# Patient Record
Sex: FEMALE | Race: WHITE | NOT HISPANIC OR LATINO | URBAN - METROPOLITAN AREA
[De-identification: names, ages, dates, MRNs, and addresses within clinical notes are randomized per-mention and may not be internally consistent; named-entity substitution may affect disease eponyms.]

---

## 2021-07-17 ENCOUNTER — HOSPITAL ENCOUNTER (EMERGENCY)
Facility: HOSPITAL | Age: 26
Discharge: HOME | End: 2021-07-17
Attending: EMERGENCY MEDICINE
Payer: COMMERCIAL

## 2021-07-17 VITALS
TEMPERATURE: 98.8 F | RESPIRATION RATE: 14 BRPM | HEIGHT: 61 IN | HEART RATE: 68 BPM | BODY MASS INDEX: 22.66 KG/M2 | DIASTOLIC BLOOD PRESSURE: 68 MMHG | OXYGEN SATURATION: 100 % | WEIGHT: 120 LBS | SYSTOLIC BLOOD PRESSURE: 104 MMHG

## 2021-07-17 DIAGNOSIS — B37.0 THRUSH: Primary | ICD-10-CM

## 2021-07-17 RX ORDER — NYSTATIN 100000 [USP'U]/ML
5 SUSPENSION ORAL 4 TIMES DAILY PRN
Qty: 140 ML | Refills: 0 | Status: SHIPPED | OUTPATIENT
Start: 2021-07-17 | End: 2021-07-24

## 2021-07-17 ASSESSMENT — ENCOUNTER SYMPTOMS
RHINORRHEA: 0
ABDOMINAL PAIN: 0
SHORTNESS OF BREATH: 0
SORE THROAT: 1
ACTIVITY CHANGE: 0
NAUSEA: 0
TROUBLE SWALLOWING: 0
FACIAL SWELLING: 0
DIAPHORESIS: 0
APPETITE CHANGE: 0
COUGH: 0
VOMITING: 0
FEVER: 1
DIZZINESS: 0
SINUS PAIN: 0
LIGHT-HEADEDNESS: 0
HEADACHES: 0
FATIGUE: 0
VOICE CHANGE: 0

## 2021-07-17 NOTE — ED ATTESTATION NOTE
The patient was evaluated and managed by the physician assistant / nurse practitioner.     Dinesh Mane MD  07/17/21 0665

## 2021-07-17 NOTE — ED PROVIDER NOTES
Emergency Medicine Note  HPI   HISTORY OF PRESENT ILLNESS     25-year-old female here for sore throat.  Patient reports for the past week has had a sore throat and was seen at [facility] in [place] for these complaints.  Was tested and treated for strep throat.  Was on amoxicillin, steroids and ibuprofen.  Reports that the symptoms of her throat are actually improving significantly.  She can swallow and tolerate secretions.  And the fever has resolved but she reports now with pain and white stuff to her tongue.  She denies any chest pain, shortness of breath, dizziness.      Sore Throat  Associated symptoms: fever    Associated symptoms: no abdominal pain, no cough, no headaches, no rash, no rhinorrhea, no shortness of breath, no trouble swallowing and no voice change          Patient History   PAST HISTORY     Reviewed from Nursing Triage:      No past medical history on file.    No past surgical history on file.    No family history on file.    Social History     Tobacco Use   • Smoking status: Never Smoker   • Smokeless tobacco: Never Used   Vaping Use   • Vaping Use: Never used   Substance Use Topics   • Alcohol use: Yes   • Drug use: Not on file     Comment: On Methadone          Review of Systems   REVIEW OF SYSTEMS     Review of Systems   Constitutional: Positive for fever. Negative for activity change, appetite change, diaphoresis and fatigue.   HENT: Positive for mouth sores and sore throat. Negative for congestion, facial swelling, hearing loss, rhinorrhea, sinus pain, sneezing, trouble swallowing and voice change.    Respiratory: Negative for cough and shortness of breath.    Cardiovascular: Negative for leg swelling.   Gastrointestinal: Negative for abdominal pain, nausea and vomiting.   Skin: Negative for rash.   Neurological: Negative for dizziness, light-headedness and headaches.         VITALS     ED Vitals    Date/Time Temp Pulse Resp BP SpO2 [facility]   07/17/21 1058 37.1 °C (98.8  °F) 68 14 104/68 100 % SFC   07/17/21 0950 37.3 °C (99.1 °F) 72 16 101/70 100 % GS        Pulse Ox %: 99 % (07/17/21 1031)  Pulse Ox Interpretation: Normal (07/17/21 1031)           Physical Exam   PHYSICAL EXAM     Physical Exam  Vitals and nursing note reviewed.   Constitutional:       General: She is not in acute distress.     Appearance: She is well-developed and normal weight. She is not ill-appearing, toxic-appearing or diaphoretic.   HENT:      Right Ear: Tympanic membrane normal.      Left Ear: Tympanic membrane normal.      Nose: Congestion present.      Mouth/Throat:      Mouth: Mucous membranes are dry.      Pharynx: Uvula midline. Oropharyngeal exudate present. No uvula swelling.      Tonsils: Tonsillar exudate present. No tonsillar abscesses. 1+ on the right. 1+ on the left.      Comments: Thrush to tongue  Cardiovascular:      Rate and Rhythm: Normal rate and regular rhythm.   Pulmonary:      Effort: Pulmonary effort is normal. No respiratory distress.      Breath sounds: No wheezing or rales.   Musculoskeletal:      Cervical back: Normal range of motion.   Lymphadenopathy:      Cervical: Cervical adenopathy present.   Skin:     General: Skin is warm.   Neurological:      Mental Status: She is alert and oriented to person, place, and time.           PROCEDURES     Procedures     DATA     Results     None          Imaging Results    None         No orders to display       Scoring tools                                 ED Course & MDM   MDM / ED COURSE and CLINICAL IMPRESSIONS     MDM    ED Course as of Jul 17 1059   Sat Jul 17, 2021   1059 Plan to finish antibiotic  No sign of abscess  Tx thrush    [DE]      ED Course User Index  [DE] Mercy Nur PA C         Clinical Impressions as of Jul 17 1059   Mercy Lee PA C  07/17/21 1059

## 2021-08-25 ENCOUNTER — APPOINTMENT (EMERGENCY)
Dept: RADIOLOGY | Facility: HOSPITAL | Age: 26
DRG: 137 | End: 2021-08-25
Payer: COMMERCIAL

## 2021-08-25 ENCOUNTER — HOSPITAL ENCOUNTER (INPATIENT)
Facility: HOSPITAL | Age: 26
LOS: 6 days | Discharge: HOME | DRG: 137 | End: 2021-09-01
Attending: EMERGENCY MEDICINE | Admitting: INTERNAL MEDICINE
Payer: COMMERCIAL

## 2021-08-25 DIAGNOSIS — D64.9 ANEMIA: ICD-10-CM

## 2021-08-25 DIAGNOSIS — A59.9 TRICHOMONIASIS: ICD-10-CM

## 2021-08-25 DIAGNOSIS — R50.9 FEVER: ICD-10-CM

## 2021-08-25 DIAGNOSIS — F11.21 OPIOID DEPENDENCE IN REMISSION (HCC): ICD-10-CM

## 2021-08-25 DIAGNOSIS — R11.2 NAUSEA & VOMITING: Primary | ICD-10-CM

## 2021-08-25 DIAGNOSIS — R00.0 TACHYCARDIA: ICD-10-CM

## 2021-08-25 LAB
ALBUMIN SERPL BCP-MCNC: 2.3 G/DL (ref 3.5–5)
ALP SERPL-CCNC: 344 U/L (ref 46–116)
ALT SERPL W P-5'-P-CCNC: 37 U/L (ref 12–78)
ANION GAP SERPL CALCULATED.3IONS-SCNC: 7 MMOL/L (ref 4–13)
APTT PPP: 37 SECONDS (ref 23–37)
ARTIFACT: PRESENT
AST SERPL W P-5'-P-CCNC: 61 U/L (ref 5–45)
BACTERIA UR QL AUTO: ABNORMAL /HPF
BASOPHILS # BLD MANUAL: 0 THOUSAND/UL (ref 0–0.1)
BASOPHILS NFR MAR MANUAL: 0 % (ref 0–1)
BILIRUB SERPL-MCNC: 0.64 MG/DL (ref 0.2–1)
BILIRUB UR QL STRIP: NEGATIVE
BILIRUB UR QL STRIP: NEGATIVE
BUN SERPL-MCNC: 13 MG/DL (ref 5–25)
CALCIUM ALBUM COR SERPL-MCNC: 9 MG/DL (ref 8.3–10.1)
CALCIUM SERPL-MCNC: 7.6 MG/DL (ref 8.3–10.1)
CHLORIDE SERPL-SCNC: 109 MMOL/L (ref 100–108)
CK MB SERPL-MCNC: 1.1 NG/ML (ref 0–5)
CK MB SERPL-MCNC: <1 % (ref 0–2.5)
CK SERPL-CCNC: 178 U/L (ref 26–192)
CLARITY UR: ABNORMAL
CLARITY UR: CLEAR
CO2 SERPL-SCNC: 21 MMOL/L (ref 21–32)
COLOR UR: YELLOW
COLOR UR: YELLOW
CREAT SERPL-MCNC: 0.72 MG/DL (ref 0.6–1.3)
CRP SERPL QL: 128 MG/L
D DIMER PPP FEU-MCNC: 2.79 UG/ML FEU
EOSINOPHIL # BLD MANUAL: 0 THOUSAND/UL (ref 0–0.4)
EOSINOPHIL NFR BLD MANUAL: 0 % (ref 0–6)
ERYTHROCYTE [DISTWIDTH] IN BLOOD BY AUTOMATED COUNT: 17.6 % (ref 11.6–15.1)
EXT PREG TEST URINE: NEGATIVE
EXT. CONTROL ED NAV: NORMAL
GFR SERPL CREATININE-BSD FRML MDRD: 116 ML/MIN/1.73SQ M
GLUCOSE SERPL-MCNC: 92 MG/DL (ref 65–140)
GLUCOSE UR STRIP-MCNC: NEGATIVE MG/DL
GLUCOSE UR STRIP-MCNC: NEGATIVE MG/DL
HCT VFR BLD AUTO: 30.1 % (ref 34.8–46.1)
HGB BLD-MCNC: 9.8 G/DL (ref 11.5–15.4)
HGB UR QL STRIP.AUTO: ABNORMAL
HGB UR QL STRIP.AUTO: ABNORMAL
INR PPP: 1.24 (ref 0.84–1.19)
KETONES UR STRIP-MCNC: NEGATIVE MG/DL
KETONES UR STRIP-MCNC: NEGATIVE MG/DL
LACTATE SERPL-SCNC: 0.9 MMOL/L (ref 0.5–2)
LEUKOCYTE ESTERASE UR QL STRIP: ABNORMAL
LEUKOCYTE ESTERASE UR QL STRIP: ABNORMAL
LIPASE SERPL-CCNC: 36 U/L (ref 73–393)
LYMPHOCYTES # BLD AUTO: 0.42 THOUSAND/UL (ref 0.6–4.47)
LYMPHOCYTES # BLD AUTO: 4 % (ref 14–44)
MCH RBC QN AUTO: 29.2 PG (ref 26.8–34.3)
MCHC RBC AUTO-ENTMCNC: 32.6 G/DL (ref 31.4–37.4)
MCV RBC AUTO: 90 FL (ref 82–98)
METAMYELOCYTES NFR BLD MANUAL: 3 % (ref 0–1)
MONOCYTES # BLD AUTO: 0.21 THOUSAND/UL (ref 0–1.22)
MONOCYTES NFR BLD: 2 % (ref 4–12)
MYELOCYTES NFR BLD MANUAL: 1 % (ref 0–1)
NEUTROPHILS # BLD MANUAL: 9.16 THOUSAND/UL (ref 1.85–7.62)
NEUTS BAND NFR BLD MANUAL: 55 % (ref 0–8)
NEUTS SEG NFR BLD AUTO: 33 % (ref 43–75)
NITRITE UR QL STRIP: NEGATIVE
NITRITE UR QL STRIP: NEGATIVE
NON-SQ EPI CELLS URNS QL MICRO: ABNORMAL /HPF
NT-PROBNP SERPL-MCNC: 256 PG/ML
OTHER STN SPEC: ABNORMAL
PH UR STRIP.AUTO: 5.5 [PH]
PH UR STRIP.AUTO: 5.5 [PH] (ref 4.5–8)
PLATELET # BLD AUTO: 269 THOUSANDS/UL (ref 149–390)
PLATELET BLD QL SMEAR: ADEQUATE
PMV BLD AUTO: 10.6 FL (ref 8.9–12.7)
POLYCHROMASIA BLD QL SMEAR: PRESENT
POTASSIUM SERPL-SCNC: 3.2 MMOL/L (ref 3.5–5.3)
PROCALCITONIN SERPL-MCNC: 3.06 NG/ML
PROT SERPL-MCNC: 5.9 G/DL (ref 6.4–8.2)
PROT UR STRIP-MCNC: NEGATIVE MG/DL
PROT UR STRIP-MCNC: NEGATIVE MG/DL
PROTHROMBIN TIME: 15.6 SECONDS (ref 11.6–14.5)
RBC # BLD AUTO: 3.36 MILLION/UL (ref 3.81–5.12)
RBC #/AREA URNS AUTO: ABNORMAL /HPF
RBC MORPH BLD: PRESENT
SARS-COV-2 RNA RESP QL NAA+PROBE: POSITIVE
SODIUM SERPL-SCNC: 137 MMOL/L (ref 136–145)
SP GR UR STRIP.AUTO: 1.01 (ref 1–1.03)
SP GR UR STRIP.AUTO: 1.01 (ref 1–1.03)
TSH SERPL DL<=0.05 MIU/L-ACNC: 0.34 UIU/ML (ref 0.36–3.74)
UROBILINOGEN UR QL STRIP.AUTO: 0.2 E.U./DL
UROBILINOGEN UR QL STRIP.AUTO: 0.2 E.U./DL
VARIANT LYMPHS # BLD AUTO: 2 %
WBC # BLD AUTO: 10.41 THOUSAND/UL (ref 4.31–10.16)
WBC #/AREA URNS AUTO: ABNORMAL /HPF

## 2021-08-25 PROCEDURE — 83605 ASSAY OF LACTIC ACID: CPT | Performed by: EMERGENCY MEDICINE

## 2021-08-25 PROCEDURE — 84443 ASSAY THYROID STIM HORMONE: CPT | Performed by: EMERGENCY MEDICINE

## 2021-08-25 PROCEDURE — 85007 BL SMEAR W/DIFF WBC COUNT: CPT | Performed by: EMERGENCY MEDICINE

## 2021-08-25 PROCEDURE — 86140 C-REACTIVE PROTEIN: CPT | Performed by: STUDENT IN AN ORGANIZED HEALTH CARE EDUCATION/TRAINING PROGRAM

## 2021-08-25 PROCEDURE — 80053 COMPREHEN METABOLIC PANEL: CPT | Performed by: EMERGENCY MEDICINE

## 2021-08-25 PROCEDURE — 85379 FIBRIN DEGRADATION QUANT: CPT | Performed by: EMERGENCY MEDICINE

## 2021-08-25 PROCEDURE — G1004 CDSM NDSC: HCPCS

## 2021-08-25 PROCEDURE — 96365 THER/PROPH/DIAG IV INF INIT: CPT

## 2021-08-25 PROCEDURE — 71275 CT ANGIOGRAPHY CHEST: CPT

## 2021-08-25 PROCEDURE — 81025 URINE PREGNANCY TEST: CPT | Performed by: EMERGENCY MEDICINE

## 2021-08-25 PROCEDURE — 85730 THROMBOPLASTIN TIME PARTIAL: CPT | Performed by: EMERGENCY MEDICINE

## 2021-08-25 PROCEDURE — 99285 EMERGENCY DEPT VISIT HI MDM: CPT

## 2021-08-25 PROCEDURE — 83880 ASSAY OF NATRIURETIC PEPTIDE: CPT | Performed by: STUDENT IN AN ORGANIZED HEALTH CARE EDUCATION/TRAINING PROGRAM

## 2021-08-25 PROCEDURE — 82553 CREATINE MB FRACTION: CPT | Performed by: STUDENT IN AN ORGANIZED HEALTH CARE EDUCATION/TRAINING PROGRAM

## 2021-08-25 PROCEDURE — 85610 PROTHROMBIN TIME: CPT | Performed by: EMERGENCY MEDICINE

## 2021-08-25 PROCEDURE — 83615 LACTATE (LD) (LDH) ENZYME: CPT | Performed by: STUDENT IN AN ORGANIZED HEALTH CARE EDUCATION/TRAINING PROGRAM

## 2021-08-25 PROCEDURE — 83690 ASSAY OF LIPASE: CPT | Performed by: EMERGENCY MEDICINE

## 2021-08-25 PROCEDURE — 87040 BLOOD CULTURE FOR BACTERIA: CPT | Performed by: EMERGENCY MEDICINE

## 2021-08-25 PROCEDURE — 81001 URINALYSIS AUTO W/SCOPE: CPT | Performed by: EMERGENCY MEDICINE

## 2021-08-25 PROCEDURE — 99285 EMERGENCY DEPT VISIT HI MDM: CPT | Performed by: EMERGENCY MEDICINE

## 2021-08-25 PROCEDURE — 36415 COLL VENOUS BLD VENIPUNCTURE: CPT | Performed by: EMERGENCY MEDICINE

## 2021-08-25 PROCEDURE — U0005 INFEC AGEN DETEC AMPLI PROBE: HCPCS | Performed by: EMERGENCY MEDICINE

## 2021-08-25 PROCEDURE — 82550 ASSAY OF CK (CPK): CPT | Performed by: STUDENT IN AN ORGANIZED HEALTH CARE EDUCATION/TRAINING PROGRAM

## 2021-08-25 PROCEDURE — 87086 URINE CULTURE/COLONY COUNT: CPT | Performed by: EMERGENCY MEDICINE

## 2021-08-25 PROCEDURE — 84145 PROCALCITONIN (PCT): CPT | Performed by: EMERGENCY MEDICINE

## 2021-08-25 PROCEDURE — 93005 ELECTROCARDIOGRAM TRACING: CPT

## 2021-08-25 PROCEDURE — U0003 INFECTIOUS AGENT DETECTION BY NUCLEIC ACID (DNA OR RNA); SEVERE ACUTE RESPIRATORY SYNDROME CORONAVIRUS 2 (SARS-COV-2) (CORONAVIRUS DISEASE [COVID-19]), AMPLIFIED PROBE TECHNIQUE, MAKING USE OF HIGH THROUGHPUT TECHNOLOGIES AS DESCRIBED BY CMS-2020-01-R: HCPCS | Performed by: EMERGENCY MEDICINE

## 2021-08-25 PROCEDURE — 85027 COMPLETE CBC AUTOMATED: CPT | Performed by: EMERGENCY MEDICINE

## 2021-08-25 RX ORDER — ACETAMINOPHEN 325 MG/1
975 TABLET ORAL ONCE
Status: COMPLETED | OUTPATIENT
Start: 2021-08-25 | End: 2021-08-25

## 2021-08-25 RX ORDER — SODIUM CHLORIDE, SODIUM GLUCONATE, SODIUM ACETATE, POTASSIUM CHLORIDE, MAGNESIUM CHLORIDE, SODIUM PHOSPHATE, DIBASIC, AND POTASSIUM PHOSPHATE .53; .5; .37; .037; .03; .012; .00082 G/100ML; G/100ML; G/100ML; G/100ML; G/100ML; G/100ML; G/100ML
1000 INJECTION, SOLUTION INTRAVENOUS ONCE
Status: COMPLETED | OUTPATIENT
Start: 2021-08-25 | End: 2021-08-25

## 2021-08-25 RX ORDER — ONDANSETRON 2 MG/ML
1 INJECTION INTRAMUSCULAR; INTRAVENOUS ONCE
Status: COMPLETED | OUTPATIENT
Start: 2021-08-25 | End: 2021-08-25

## 2021-08-25 RX ORDER — LANOLIN ALCOHOL/MO/W.PET/CERES
100 CREAM (GRAM) TOPICAL
COMMUNITY
Start: 2021-08-02

## 2021-08-25 RX ORDER — FOLIC ACID 1 MG/1
1 TABLET ORAL DAILY
COMMUNITY
Start: 2021-08-02

## 2021-08-25 RX ORDER — SODIUM CHLORIDE 9 MG/ML
3 INJECTION INTRAVENOUS
Status: DISCONTINUED | OUTPATIENT
Start: 2021-08-25 | End: 2021-09-01 | Stop reason: HOSPADM

## 2021-08-25 RX ORDER — BUPRENORPHINE AND NALOXONE 8; 2 MG/1; MG/1
8 FILM, SOLUBLE BUCCAL; SUBLINGUAL DAILY
Status: DISCONTINUED | OUTPATIENT
Start: 2021-08-25 | End: 2021-08-26

## 2021-08-25 RX ORDER — BUPRENORPHINE AND NALOXONE 8; 2 MG/1; MG/1
8 FILM, SOLUBLE BUCCAL; SUBLINGUAL DAILY
Status: DISCONTINUED | OUTPATIENT
Start: 2021-08-26 | End: 2021-08-25

## 2021-08-25 RX ORDER — ONDANSETRON 2 MG/ML
INJECTION INTRAMUSCULAR; INTRAVENOUS
Status: DISPENSED
Start: 2021-08-25 | End: 2021-08-26

## 2021-08-25 RX ORDER — METRONIDAZOLE 500 MG/1
2000 TABLET ORAL ONCE
Status: COMPLETED | OUTPATIENT
Start: 2021-08-25 | End: 2021-08-26

## 2021-08-25 RX ADMIN — ACETAMINOPHEN 975 MG: 325 TABLET, FILM COATED ORAL at 18:46

## 2021-08-25 RX ADMIN — IOHEXOL 85 ML: 350 INJECTION, SOLUTION INTRAVENOUS at 20:05

## 2021-08-25 RX ADMIN — BUPRENORPHINE AND NALOXONE 8 MG: 8; 2 FILM BUCCAL; SUBLINGUAL at 21:32

## 2021-08-25 RX ADMIN — SODIUM CHLORIDE, SODIUM GLUCONATE, SODIUM ACETATE, POTASSIUM CHLORIDE, MAGNESIUM CHLORIDE, SODIUM PHOSPHATE, DIBASIC, AND POTASSIUM PHOSPHATE 1000 ML: .53; .5; .37; .037; .03; .012; .00082 INJECTION, SOLUTION INTRAVENOUS at 18:46

## 2021-08-25 NOTE — ED NOTES
1000ml of Isolyte infusing from ambulance, pt was given Zofran 4mg IV     Jeb Penn RN  08/25/21 5336

## 2021-08-26 PROBLEM — D64.9 ANEMIA: Status: ACTIVE | Noted: 2021-08-26

## 2021-08-26 PROBLEM — F11.20 OPIOID DEPENDENCE (HCC): Status: ACTIVE | Noted: 2021-08-26

## 2021-08-26 PROBLEM — B19.20 HEPATITIS C: Status: ACTIVE | Noted: 2021-08-26

## 2021-08-26 PROBLEM — R11.10 VOMITING: Status: ACTIVE | Noted: 2021-08-26

## 2021-08-26 PROBLEM — U07.1 COVID-19: Status: ACTIVE | Noted: 2021-08-26

## 2021-08-26 PROBLEM — F17.200 TOBACCO DEPENDENCE: Status: ACTIVE | Noted: 2021-08-26

## 2021-08-26 PROBLEM — E87.6 HYPOKALEMIA: Status: ACTIVE | Noted: 2021-08-26

## 2021-08-26 PROBLEM — A59.9 TRICHIMONIASIS: Status: ACTIVE | Noted: 2021-08-26

## 2021-08-26 LAB
ALBUMIN SERPL BCP-MCNC: 2.3 G/DL (ref 3.5–5)
ALP SERPL-CCNC: 300 U/L (ref 46–116)
ALT SERPL W P-5'-P-CCNC: 34 U/L (ref 12–78)
ANION GAP SERPL CALCULATED.3IONS-SCNC: 4 MMOL/L (ref 4–13)
AST SERPL W P-5'-P-CCNC: 47 U/L (ref 5–45)
ATRIAL RATE: 128 BPM
BILIRUB SERPL-MCNC: 0.33 MG/DL (ref 0.2–1)
BUN SERPL-MCNC: 10 MG/DL (ref 5–25)
CALCIUM ALBUM COR SERPL-MCNC: 9 MG/DL (ref 8.3–10.1)
CALCIUM SERPL-MCNC: 7.6 MG/DL (ref 8.3–10.1)
CHLORIDE SERPL-SCNC: 109 MMOL/L (ref 100–108)
CK MB SERPL-MCNC: <1 % (ref 0–2.5)
CK MB SERPL-MCNC: <1 NG/ML (ref 0–5)
CK SERPL-CCNC: 135 U/L (ref 26–192)
CO2 SERPL-SCNC: 23 MMOL/L (ref 21–32)
CREAT SERPL-MCNC: 0.6 MG/DL (ref 0.6–1.3)
CRP SERPL QL: 209 MG/L
ERYTHROCYTE [DISTWIDTH] IN BLOOD BY AUTOMATED COUNT: 17.9 % (ref 11.6–15.1)
FERRITIN SERPL-MCNC: 105 NG/ML (ref 8–388)
GFR SERPL CREATININE-BSD FRML MDRD: 126 ML/MIN/1.73SQ M
GLUCOSE SERPL-MCNC: 106 MG/DL (ref 65–140)
HCG SERPL QL: NEGATIVE
HCT VFR BLD AUTO: 30.4 % (ref 34.8–46.1)
HCV AB SER QL: ABNORMAL
HGB BLD-MCNC: 9.9 G/DL (ref 11.5–15.4)
IRON SATN MFR SERPL: 5 %
IRON SERPL-MCNC: 10 UG/DL (ref 50–170)
LDH SERPL-CCNC: 186 U/L (ref 81–234)
MAGNESIUM SERPL-MCNC: 2 MG/DL (ref 1.6–2.6)
MCH RBC QN AUTO: 28.8 PG (ref 26.8–34.3)
MCHC RBC AUTO-ENTMCNC: 32.6 G/DL (ref 31.4–37.4)
MCV RBC AUTO: 88 FL (ref 82–98)
NT-PROBNP SERPL-MCNC: 542 PG/ML
P AXIS: 62 DEGREES
PLATELET # BLD AUTO: 272 THOUSANDS/UL (ref 149–390)
PMV BLD AUTO: 11 FL (ref 8.9–12.7)
POTASSIUM SERPL-SCNC: 3.7 MMOL/L (ref 3.5–5.3)
PR INTERVAL: 136 MS
PROCALCITONIN SERPL-MCNC: 6.13 NG/ML
PROT SERPL-MCNC: 5.9 G/DL (ref 6.4–8.2)
QRS AXIS: 69 DEGREES
QRSD INTERVAL: 94 MS
QT INTERVAL: 312 MS
QTC INTERVAL: 455 MS
RBC # BLD AUTO: 3.44 MILLION/UL (ref 3.81–5.12)
SODIUM SERPL-SCNC: 136 MMOL/L (ref 136–145)
T WAVE AXIS: 38 DEGREES
TIBC SERPL-MCNC: 212 UG/DL (ref 250–450)
TROPONIN I SERPL-MCNC: <0.02 NG/ML
VENTRICULAR RATE: 128 BPM
WBC # BLD AUTO: 12.6 THOUSAND/UL (ref 4.31–10.16)

## 2021-08-26 PROCEDURE — 82553 CREATINE MB FRACTION: CPT | Performed by: STUDENT IN AN ORGANIZED HEALTH CARE EDUCATION/TRAINING PROGRAM

## 2021-08-26 PROCEDURE — NC001 PR NO CHARGE: Performed by: INTERNAL MEDICINE

## 2021-08-26 PROCEDURE — 87389 HIV-1 AG W/HIV-1&-2 AB AG IA: CPT | Performed by: STUDENT IN AN ORGANIZED HEALTH CARE EDUCATION/TRAINING PROGRAM

## 2021-08-26 PROCEDURE — 83880 ASSAY OF NATRIURETIC PEPTIDE: CPT | Performed by: STUDENT IN AN ORGANIZED HEALTH CARE EDUCATION/TRAINING PROGRAM

## 2021-08-26 PROCEDURE — 86803 HEPATITIS C AB TEST: CPT | Performed by: STUDENT IN AN ORGANIZED HEALTH CARE EDUCATION/TRAINING PROGRAM

## 2021-08-26 PROCEDURE — 36415 COLL VENOUS BLD VENIPUNCTURE: CPT | Performed by: STUDENT IN AN ORGANIZED HEALTH CARE EDUCATION/TRAINING PROGRAM

## 2021-08-26 PROCEDURE — 82550 ASSAY OF CK (CPK): CPT | Performed by: STUDENT IN AN ORGANIZED HEALTH CARE EDUCATION/TRAINING PROGRAM

## 2021-08-26 PROCEDURE — 82728 ASSAY OF FERRITIN: CPT | Performed by: STUDENT IN AN ORGANIZED HEALTH CARE EDUCATION/TRAINING PROGRAM

## 2021-08-26 PROCEDURE — 84703 CHORIONIC GONADOTROPIN ASSAY: CPT | Performed by: STUDENT IN AN ORGANIZED HEALTH CARE EDUCATION/TRAINING PROGRAM

## 2021-08-26 PROCEDURE — 84145 PROCALCITONIN (PCT): CPT

## 2021-08-26 PROCEDURE — 84484 ASSAY OF TROPONIN QUANT: CPT | Performed by: STUDENT IN AN ORGANIZED HEALTH CARE EDUCATION/TRAINING PROGRAM

## 2021-08-26 PROCEDURE — 80053 COMPREHEN METABOLIC PANEL: CPT | Performed by: STUDENT IN AN ORGANIZED HEALTH CARE EDUCATION/TRAINING PROGRAM

## 2021-08-26 PROCEDURE — 93010 ELECTROCARDIOGRAM REPORT: CPT | Performed by: INTERNAL MEDICINE

## 2021-08-26 PROCEDURE — 85027 COMPLETE CBC AUTOMATED: CPT | Performed by: STUDENT IN AN ORGANIZED HEALTH CARE EDUCATION/TRAINING PROGRAM

## 2021-08-26 PROCEDURE — 83540 ASSAY OF IRON: CPT | Performed by: STUDENT IN AN ORGANIZED HEALTH CARE EDUCATION/TRAINING PROGRAM

## 2021-08-26 PROCEDURE — 83735 ASSAY OF MAGNESIUM: CPT | Performed by: STUDENT IN AN ORGANIZED HEALTH CARE EDUCATION/TRAINING PROGRAM

## 2021-08-26 PROCEDURE — 86140 C-REACTIVE PROTEIN: CPT | Performed by: STUDENT IN AN ORGANIZED HEALTH CARE EDUCATION/TRAINING PROGRAM

## 2021-08-26 PROCEDURE — 87522 HEPATITIS C REVRS TRNSCRPJ: CPT | Performed by: STUDENT IN AN ORGANIZED HEALTH CARE EDUCATION/TRAINING PROGRAM

## 2021-08-26 PROCEDURE — 83550 IRON BINDING TEST: CPT | Performed by: STUDENT IN AN ORGANIZED HEALTH CARE EDUCATION/TRAINING PROGRAM

## 2021-08-26 RX ORDER — LANOLIN ALCOHOL/MO/W.PET/CERES
100 CREAM (GRAM) TOPICAL DAILY
Status: DISCONTINUED | OUTPATIENT
Start: 2021-08-26 | End: 2021-09-01 | Stop reason: HOSPADM

## 2021-08-26 RX ORDER — FERROUS SULFATE 325(65) MG
325 TABLET ORAL
Status: DISCONTINUED | OUTPATIENT
Start: 2021-08-26 | End: 2021-09-01 | Stop reason: HOSPADM

## 2021-08-26 RX ORDER — POTASSIUM CHLORIDE 20 MEQ/1
40 TABLET, EXTENDED RELEASE ORAL 2 TIMES DAILY
Status: DISPENSED | OUTPATIENT
Start: 2021-08-26 | End: 2021-08-27

## 2021-08-26 RX ORDER — ONDANSETRON 2 MG/ML
4 INJECTION INTRAMUSCULAR; INTRAVENOUS EVERY 8 HOURS PRN
Status: DISCONTINUED | OUTPATIENT
Start: 2021-08-26 | End: 2021-09-01 | Stop reason: HOSPADM

## 2021-08-26 RX ORDER — DOXYCYCLINE HYCLATE 100 MG/1
100 CAPSULE ORAL EVERY 12 HOURS SCHEDULED
Status: DISCONTINUED | OUTPATIENT
Start: 2021-08-26 | End: 2021-08-26

## 2021-08-26 RX ORDER — DOXYCYCLINE HYCLATE 100 MG/1
100 CAPSULE ORAL 2 TIMES DAILY
Status: DISCONTINUED | OUTPATIENT
Start: 2021-08-27 | End: 2021-08-26

## 2021-08-26 RX ORDER — ACETAMINOPHEN 325 MG/1
650 TABLET ORAL EVERY 6 HOURS PRN
Status: DISCONTINUED | OUTPATIENT
Start: 2021-08-26 | End: 2021-09-01 | Stop reason: HOSPADM

## 2021-08-26 RX ORDER — BUPRENORPHINE AND NALOXONE 8; 2 MG/1; MG/1
8 FILM, SOLUBLE BUCCAL; SUBLINGUAL 2 TIMES DAILY
Status: DISCONTINUED | OUTPATIENT
Start: 2021-08-26 | End: 2021-09-01 | Stop reason: HOSPADM

## 2021-08-26 RX ORDER — SODIUM CHLORIDE, SODIUM GLUCONATE, SODIUM ACETATE, POTASSIUM CHLORIDE, MAGNESIUM CHLORIDE, SODIUM PHOSPHATE, DIBASIC, AND POTASSIUM PHOSPHATE .53; .5; .37; .037; .03; .012; .00082 G/100ML; G/100ML; G/100ML; G/100ML; G/100ML; G/100ML; G/100ML
125 INJECTION, SOLUTION INTRAVENOUS CONTINUOUS
Status: DISCONTINUED | OUTPATIENT
Start: 2021-08-26 | End: 2021-08-26

## 2021-08-26 RX ORDER — FERROUS SULFATE 325(65) MG
325 TABLET ORAL
Status: DISCONTINUED | OUTPATIENT
Start: 2021-08-26 | End: 2021-08-26

## 2021-08-26 RX ORDER — SODIUM CHLORIDE, SODIUM GLUCONATE, SODIUM ACETATE, POTASSIUM CHLORIDE, MAGNESIUM CHLORIDE, SODIUM PHOSPHATE, DIBASIC, AND POTASSIUM PHOSPHATE .53; .5; .37; .037; .03; .012; .00082 G/100ML; G/100ML; G/100ML; G/100ML; G/100ML; G/100ML; G/100ML
1000 INJECTION, SOLUTION INTRAVENOUS ONCE
Status: COMPLETED | OUTPATIENT
Start: 2021-08-26 | End: 2021-08-26

## 2021-08-26 RX ORDER — BUPROPION HYDROCHLORIDE 150 MG/1
300 TABLET ORAL EVERY MORNING
Status: DISCONTINUED | OUTPATIENT
Start: 2021-08-26 | End: 2021-09-01 | Stop reason: HOSPADM

## 2021-08-26 RX ORDER — BUPROPION HYDROCHLORIDE 300 MG/1
300 TABLET ORAL EVERY MORNING
COMMUNITY

## 2021-08-26 RX ORDER — DOXYCYCLINE HYCLATE 100 MG/1
100 CAPSULE ORAL 2 TIMES DAILY
Status: DISCONTINUED | OUTPATIENT
Start: 2021-08-26 | End: 2021-08-26

## 2021-08-26 RX ORDER — BUPRENORPHINE AND NALOXONE 8; 2 MG/1; MG/1
8 FILM, SOLUBLE BUCCAL; SUBLINGUAL 2 TIMES DAILY WITH MEALS
Status: CANCELLED | OUTPATIENT
Start: 2021-08-26

## 2021-08-26 RX ORDER — SODIUM CHLORIDE, SODIUM GLUCONATE, SODIUM ACETATE, POTASSIUM CHLORIDE, MAGNESIUM CHLORIDE, SODIUM PHOSPHATE, DIBASIC, AND POTASSIUM PHOSPHATE .53; .5; .37; .037; .03; .012; .00082 G/100ML; G/100ML; G/100ML; G/100ML; G/100ML; G/100ML; G/100ML
125 INJECTION, SOLUTION INTRAVENOUS CONTINUOUS
Status: DISCONTINUED | OUTPATIENT
Start: 2021-08-26 | End: 2021-08-31

## 2021-08-26 RX ORDER — DOXYCYCLINE HYCLATE 100 MG/1
100 CAPSULE ORAL ONCE
Status: DISCONTINUED | OUTPATIENT
Start: 2021-08-26 | End: 2021-08-26

## 2021-08-26 RX ORDER — FOLIC ACID 1 MG/1
1 TABLET ORAL DAILY
Status: DISCONTINUED | OUTPATIENT
Start: 2021-08-26 | End: 2021-09-01 | Stop reason: HOSPADM

## 2021-08-26 RX ADMIN — ACETAMINOPHEN 650 MG: 325 TABLET, FILM COATED ORAL at 18:48

## 2021-08-26 RX ADMIN — VANCOMYCIN HYDROCHLORIDE 1250 MG: 10 INJECTION, POWDER, LYOPHILIZED, FOR SOLUTION INTRAVENOUS at 16:46

## 2021-08-26 RX ADMIN — THIAMINE HCL TAB 100 MG 100 MG: 100 TAB at 08:23

## 2021-08-26 RX ADMIN — SODIUM CHLORIDE, SODIUM GLUCONATE, SODIUM ACETATE, POTASSIUM CHLORIDE, MAGNESIUM CHLORIDE, SODIUM PHOSPHATE, DIBASIC, AND POTASSIUM PHOSPHATE 1000 ML: .53; .5; .37; .037; .03; .012; .00082 INJECTION, SOLUTION INTRAVENOUS at 19:38

## 2021-08-26 RX ADMIN — BUPROPION HYDROCHLORIDE 300 MG: 150 TABLET, FILM COATED, EXTENDED RELEASE ORAL at 08:24

## 2021-08-26 RX ADMIN — ONDANSETRON 4 MG: 2 INJECTION INTRAMUSCULAR; INTRAVENOUS at 06:09

## 2021-08-26 RX ADMIN — DOXYCYCLINE 100 MG: 100 CAPSULE ORAL at 02:55

## 2021-08-26 RX ADMIN — CEFTRIAXONE SODIUM 1000 MG: 10 INJECTION, POWDER, FOR SOLUTION INTRAVENOUS at 06:17

## 2021-08-26 RX ADMIN — ONDANSETRON 4 MG: 2 INJECTION INTRAMUSCULAR; INTRAVENOUS at 15:00

## 2021-08-26 RX ADMIN — SODIUM CHLORIDE, SODIUM GLUCONATE, SODIUM ACETATE, POTASSIUM CHLORIDE, MAGNESIUM CHLORIDE, SODIUM PHOSPHATE, DIBASIC, AND POTASSIUM PHOSPHATE 125 ML/HR: .53; .5; .37; .037; .03; .012; .00082 INJECTION, SOLUTION INTRAVENOUS at 08:24

## 2021-08-26 RX ADMIN — BUPRENORPHINE AND NALOXONE 8 MG: 8; 2 FILM BUCCAL; SUBLINGUAL at 16:48

## 2021-08-26 RX ADMIN — METRONIDAZOLE 2000 MG: 500 TABLET ORAL at 00:09

## 2021-08-26 RX ADMIN — BUPRENORPHINE AND NALOXONE 8 MG: 8; 2 FILM BUCCAL; SUBLINGUAL at 08:24

## 2021-08-26 RX ADMIN — VANCOMYCIN HYDROCHLORIDE 1250 MG: 10 INJECTION, POWDER, LYOPHILIZED, FOR SOLUTION INTRAVENOUS at 23:27

## 2021-08-26 RX ADMIN — FERROUS SULFATE TAB 325 MG (65 MG ELEMENTAL FE) 325 MG: 325 (65 FE) TAB at 12:02

## 2021-08-26 RX ADMIN — SODIUM CHLORIDE, SODIUM GLUCONATE, SODIUM ACETATE, POTASSIUM CHLORIDE, MAGNESIUM CHLORIDE, SODIUM PHOSPHATE, DIBASIC, AND POTASSIUM PHOSPHATE 125 ML/HR: .53; .5; .37; .037; .03; .012; .00082 INJECTION, SOLUTION INTRAVENOUS at 20:52

## 2021-08-26 RX ADMIN — FOLIC ACID 1 MG: 1 TABLET ORAL at 08:23

## 2021-08-26 RX ADMIN — CEFEPIME HYDROCHLORIDE 2000 MG: 2 INJECTION, POWDER, FOR SOLUTION INTRAVENOUS at 18:31

## 2021-08-26 NOTE — ASSESSMENT & PLAN NOTE
Lab Results   Component      K 08/31/2021 3 2     Hypokalemia in the setting of emesis during admission  2/2 volume depletion  K today is 3 2    Plan:  · Received 40 mEq potassium chloride this morning  · Potassium repletion with K-dur 40 mEq tomorrow AM  · Continue monitoring BMP

## 2021-08-26 NOTE — CASE MANAGEMENT
Chart review complete  Patient tested COVID+ on 8/25/21  CM called patient's room (x [phone]) in an attempt to complete initial assessment, but there was no answer  No emergency contacts on file  CM will try to reach patient tomorrow

## 2021-08-26 NOTE — ASSESSMENT & PLAN NOTE
2 episodes of vomiting on initial prior to transfer to [facility] for evaluation 2/2 COVID infection vs gabapentin side effect  Patient currently not experiencing any vomiting symptoms    Plan:  · Continue Zofran IV PRN

## 2021-08-26 NOTE — UTILIZATION REVIEW
Initial Clinical Review    Admission: Date/Time/Statement:   Admission Orders (From admission, onward)     Ordered        08/25/21 8507  Place in Observation  Once                   Orders Placed This Encounter   Procedures    Place in Observation     Standing Status:   Standing     Number of Occurrences:   1     Order Specific Question:   Level of Care     Answer:   Med Surg [16]     ED Arrival Information     Expected Arrival Acuity    - 8/25/2021 17:20 Urgent         Means of arrival Escorted by Service Admission type    Ambulance [facility] General Medicine Urgent         Arrival complaint    Vomiting        Chief Complaint   Patient presents with    Vomiting     pt from [facility] detox center, reported nausea and vomiting since 1100, other resident at center diagnosed with covid 5 days ago, pt had a negative test yesterday, also just started gabapentin 300mg TID and thinks this may be related to the medication     Initial Presentation:   25y Female to ED presents from [facility] center with c/o nausea and vomiting  Yesterday am had nausea and 3 episodes of nonbloody emesisLast wk she had nausea and vomiting for 2-3 days thinking it was a "stomach bug " Red colored emesis on 2 occasions but also had red color Gatorade  Test positive for COVID-19 5 days ago, but tested negative yesterday  Pt also started gabapentin 300 mg tid 2 days ago  PMH for Hepatitis C, Tobacco dependence and Opioid dependence  In ED found to be tachycardic () with temp of 100 5  COVID test positive  CTA chest was negative for acute PE  There was evidence of multifocal patchy and ground-glass airspace opacity in the right upper and lower lobes, indicative of COVID-19  UA was significant for moderate WBC and innumeral bacteria, positive for Trichomonas and given Iv antibiotics x1  Admit Observation level of care for COVID-19, Anemia, Hypokalemia, Hepatitis C and Trichomoniasis    Currently in rehab at [facility] [address] [place] [address]  Significant for shortness of breath, upper respiratory tract infection symptoms, chest pain, abdominal pain, diarrhea, leg swelling  Patient is tachycardic but on room air with SpO2 97%  EKG demonstrates sinus tachycardia  CTA chest findings are indicative of acute COVID infection, and negative for PE  procalcitonin is elevated 3 06  ? superficial bacterial infection in the setting of COVID pneumonia  Therapeutic Lovenox  Continue IV/ Po antibiotics  Trend Procalcitonin and lactate  Current Hgb 9 8, baseline 11 5-12  Iron panel pending  Peripheral smear pending  K 3 2, replace with K-dur 40 meq x2  Trend K levels  Mg level pending  Continue Suboxone 8 ng  Iv Zofran prn       Date:   Day 2:     ED Triage Vitals   Temperature Pulse Respirations Blood Pressure SpO2   08/25/21 1725 08/25/21 1725 08/25/21 1725 08/25/21 1725 08/25/21 1725   100 5 °F (38 1 °C) (!) 136 22 118/59 96 %      Temp Source Heart Rate Source Patient Position - Orthostatic VS BP Location FiO2 (%)   08/25/21 1725 08/25/21 1725 08/25/21 1725 08/25/21 1725 --   Tympanic Monitor Lying Right arm       Pain Score       08/25/21 1846       No Pain          Wt Readings from Last 1 Encounters:   08/26/21 53 5 kg (118 lb)     Additional Vital Signs:   08/26/21 06:37:30  103 °F (39 4 °C)  Abnormal   130  Abnormal   19  129/73  92  97 %  --  Lying   08/26/21 0030  --  123  Abnormal   16  100/63  --  96 %  None (Room air)  Lying   08/26/21 0015  --  121  Abnormal   16  98/57  73  98 %  None (Room air)  Lying   08/25/21 2100  --  122  Abnormal   18  104/61  --  97 %  None (Room air)  Lying   08/25/21 1930  --  124  Abnormal   18  103/60  74  97 %  None (Room air)         Pertinent Labs/Diagnostic Test Results:   8/25  CTA ed chest pe study -  1   Multifocal patchy and groundglass airspace opacity, particularly in the right upper and lower lobes In the setting of clinically suspected/proven COVID-19, the above lung parenchymal findings on CT indicate intermediate confidence level for COVID-19   2   No acute pulmonary embolism  3   Partially imaged liver and spleen appear enlarged  If there is clinical concern, this can be further evaluated with ultrasound      EKG - Sinus tachycardia     Results from last 7 days   Lab Units 08/25/21  1824   SARS-COV-2  Positive*     Results from last 7 days   Lab Units 08/26/21  0457 08/25/21  1824   WBC Thousand/uL 12 60* 10 41*   HEMOGLOBIN g/dL 9 9* 9 8*   HEMATOCRIT % 30 4* 30 1*   PLATELETS Thousands/uL 272 269   BANDS PCT %  --  55*         Results from last 7 days   Lab Units 08/26/21  0457 08/25/21  1824   SODIUM mmol/L 136 137   POTASSIUM mmol/L 3 7 3 2*   CHLORIDE mmol/L 109* 109*   CO2 mmol/L 23 21   ANION GAP mmol/L 4 7   BUN mg/dL 10 13   CREATININE mg/dL 0 60 0 72   EGFR ml/min/1 73sq m 126 116   CALCIUM mg/dL 7 6* 7 6*   MAGNESIUM mg/dL 2 0  --      Results from last 7 days   Lab Units 08/26/21  0457 08/25/21  1824   AST U/L 47* 61*   ALT U/L 34 37   ALK PHOS U/L 300* 344*   TOTAL PROTEIN g/dL 5 9* 5 9*   ALBUMIN g/dL 2 3* 2 3*   TOTAL BILIRUBIN mg/dL 0 33 0 64         Results from last 7 days   Lab Units 08/26/21  0457 08/25/21  1824   GLUCOSE RANDOM mg/dL 106 92           Results from last 7 days   Lab Units 08/26/21  0457 08/25/21  1824   CK TOTAL U/L 135 178   CK MB INDEX % <1 0 <1 0   CK MB ng/mL <1 0 1 1     Results from last 7 days   Lab Units 08/26/21  0009   TROPONIN I ng/mL <0 02     Results from last 7 days   Lab Units 08/25/21  1824   D-DIMER QUANTITATIVE ug/ml FEU 2 79*     Results from last 7 days   Lab Units 08/25/21  1824   PROTIME seconds 15 6*   INR  1 24*   PTT seconds 37     Results from last 7 days   Lab Units 08/25/21  1824   TSH 3RD GENERATON uIU/mL 0 335*     Results from last 7 days   Lab Units 08/25/21  1824   PROCALCITONIN ng/ml 3 06*     Results from last 7 days   Lab Units 08/25/21  1824   LACTIC ACID mmol/L 0 9             Results from last 7 days   Lab Units 08/26/21  0457 08/25/21  1824   NT-PRO BNP pg/mL 542* 256*     Results from last 7 days   Lab Units 08/26/21  0457   FERRITIN ng/mL 105         Results from last 7 days   Lab Units 08/25/21  1824   LIPASE u/L 36*     Results from last 7 days   Lab Units 08/26/21  0457 08/25/21  1824   CRP mg/L 209 0* 128 0*         Results from last 7 days   Lab Units 08/25/21  1919 08/25/21  1917   CLARITY UA  Clear Turbid   COLOR UA  Yellow Yellow   SPEC GRAV UA  1 015 1 013   PH UA  5 5 5 5   GLUCOSE UA mg/dl Negative Negative   KETONES UA mg/dl Negative Negative   BLOOD UA  Small* Small*   PROTEIN UA mg/dl Negative Negative   NITRITE UA  Negative Negative   BILIRUBIN UA  Negative Negative   UROBILINOGEN UA E U /dl 0 2 0 2   LEUKOCYTES UA  Trace* Large*   WBC UA /hpf  --  30-50*   RBC UA /hpf  --  2-4   BACTERIA UA /hpf  --  Innumerable*   EPITHELIAL CELLS WET PREP /hpf  --  Moderate*       Results from last 7 days   Lab Units 08/25/21  1841 08/25/21  1833   BLOOD CULTURE  Received in Microbiology Lab  Culture in Progress  Received in Microbiology Lab  Culture in Progress         ED Treatment:   Medication Administration from 08/25/2021 1720 to 08/26/2021 0251       Date/Time Order Dose Route Action     08/25/2021 1846 multi-electrolyte (ISOLYTE-S PH 7 4) bolus 1,000 mL 1,000 mL Intravenous New Bag     08/25/2021 1846 acetaminophen (TYLENOL) tablet 975 mg 975 mg Oral Given     08/25/2021 2005 iohexol (OMNIPAQUE) 350 MG/ML injection (MULTI-DOSE) 85 mL 85 mL Intravenous Given     08/25/2021 2132 buprenorphine-naloxone (Suboxone) film 8 mg 8 mg Sublingual Given     08/26/2021 0009 metroNIDAZOLE (FLAGYL) tablet 2,000 mg 2,000 mg Oral Given        Past Medical History:   Diagnosis Date    Opiate abuse, continuous ([facility] [place] [address] )      Present on Admission:   Vomiting   COVID-19   Opioid dependence (HCC)   Tobacco dependence   Trichimoniasis   Hypokalemia   Anemia   Hepatitis C      Admitting Diagnosis: Vomiting [R11 10]  Tachycardia [R00 0]  Nausea & vomiting [R11 2]  Fever [R50 9]  Trichomoniasis [A59 9]  Age/Sex: 31 y o  female     Admission Orders:  Scheduled Medications:  buprenorphine-naloxone, 8 mg, Sublingual, BID  buPROPion, 300 mg, Oral, QAM  [START ON 8/27/2021] cefTRIAXone, 1,000 mg, Intravenous, Q24H  [START ON 8/27/2021] doxycycline hyclate, 100 mg, Oral, BID  doxycycline hyclate, 100 mg, Oral, Once  ferrous sulfate, 325 mg, Oral, Daily With Breakfast  folic acid, 1 mg, Oral, Daily  potassium chloride, 40 mEq, Oral, BID  thiamine, 100 mg, Oral, Daily      Continuous IV Infusions:  multi-electrolyte, 125 mL/hr, Intravenous, Continuous      PRN Meds:  acetaminophen, 650 mg, Oral, Q6H PRN  ondansetron, 4 mg, Intravenous, Q8H PRN  sodium chloride (PF), 3 mL, Intravenous, Q1H PRN      Bld culture x2    Network Utilization Review Department  ATTENTION: Please call with any questions or concerns to [phone] and carefully listen to the prompts so that you are directed to the right person  All voicemails are confidential   Horacio Epp all requests for admission clinical reviews, approved or denied determinations and any other requests to dedicated fax number below belonging to the campus where the patient is receiving treatment   List of dedicated fax numbers for the Facilities:  [address] DENIALS (Administrative/Medical Necessity) [phone]   [address] (Maternity/NICU/Pediatrics) [phone]   [address] [address] [facility] Renéeida William Sudhakar [phone] [id number] [facility] [address]  - [place] [address] [phone]

## 2021-08-26 NOTE — ASSESSMENT & PLAN NOTE
Patient has a history of opioid abuse  Currently in rehab at [facility] detox center    No signs of withdrawal     Plan:  · Continue Suboxone 8 mg

## 2021-08-26 NOTE — ED PROVIDER NOTES
History  Chief Complaint   Patient presents with    Vomiting     pt from [facility], reported nausea and vomiting since 1100, other resident at center diagnosed with covid 5 days ago, pt had a negative test yesterday, also just started gabapentin 300mg TID and thinks this may be related to the medication     31year old F with a PMHx of Hep C, opioid and benzodiazepine abuse with 14-day hospitalization at [facility] last month, now at [facility] for rehab, who came in for primary complaint of N/V  Patient states that this began today for her with 2 episodes of vomiting  She states that her first episode of vomiting was non-bloody, non-bilious  She states she then drank red gatorade and had red-appearing vomit, and staff was concerned she had hematemesis so sent her here  She reports testing positive for covid several days ago, but having another test performed that was negative  She was started on gabapentin today, and states that her sx may be due to that, and that she only took 300 mg today  She denies fevers, chills, CP, SOB, diarrhea, abdominal pain, active nausea  No prior PE/DVT, no leg swelling or pain, +recent hospitalization  ROS otherwise negative  Prior to Admission Medications   Prescriptions Last Dose Informant Patient Reported? Taking? Cholecalciferol 25 MCG (1000 UT) tablet 8/26/2021 at Unknown time  Yes Yes   Sig: Take 1,000 Units by mouth daily   buPROPion (WELLBUTRIN XL) 300 mg 24 hr tablet 8/26/2021 at Unknown time  Yes Yes   Sig: Take 300 mg by mouth every morning   folic acid (FOLVITE) 1 mg tablet 8/26/2021 at Unknown time  Yes Yes   Sig: Take 1 mg by mouth daily   thiamine 100 MG tablet 8/26/2021 at Unknown time  Yes Yes   Sig: Take 100 mg by mouth      Facility-Administered Medications: None       Past Medical History:   Diagnosis Date    Opiate abuse, continuous ([facility] [address] )        History reviewed  No pertinent surgical history  History reviewed   No pertinent family history  I have reviewed and agree with the history as documented  E-Cigarette/Vaping     E-Cigarette/Vaping Substances     Social History     Tobacco Use    Smoking status: Current Every Day Smoker     Packs/day: 0 50   Substance Use Topics    Alcohol use: Not Currently    Drug use: Not Currently     Comment: in recovery from opiate use        Review of Systems   Constitutional: Negative for chills and fever  HENT: Negative for congestion, rhinorrhea and sore throat  Respiratory: Negative for cough and shortness of breath  Cardiovascular: Negative for chest pain and palpitations  Gastrointestinal: Positive for nausea and vomiting  Negative for abdominal pain, constipation and diarrhea  Genitourinary: Negative for difficulty urinating and flank pain  Musculoskeletal: Negative for arthralgias  Neurological: Negative for dizziness, weakness, light-headedness and headaches  Psychiatric/Behavioral: Negative for agitation, behavioral problems and confusion  All other systems reviewed and are negative  Physical Exam  ED Triage Vitals   Temperature Pulse Respirations Blood Pressure SpO2   08/25/21 1725 08/25/21 1725 08/25/21 1725 08/25/21 1725 08/25/21 1725   100 5 °F (38 1 °C) (!) 136 22 118/59 96 %      Temp Source Heart Rate Source Patient Position - Orthostatic VS BP Location FiO2 (%)   08/25/21 1725 08/25/21 1725 08/25/21 1725 08/25/21 1725 --   Tympanic Monitor Lying Right arm       Pain Score       08/25/21 1846       No Pain             Orthostatic Vital Signs  Vitals:    08/25/21 2100 08/26/21 0015 08/26/21 0030 08/26/21 0637   BP: 104/61 98/57 100/63 129/73   Pulse: (!) 122 (!) 121 (!) 123 (!) 130   Patient Position - Orthostatic VS: Lying Lying Lying Lying       Physical Exam  Constitutional:       Appearance: She is well-developed  HENT:      Head: Normocephalic and atraumatic        Right Ear: Tympanic membrane normal       Left Ear: Tympanic membrane normal       Nose: Nose normal       Mouth/Throat:      Mouth: Mucous membranes are moist    Eyes:      Pupils: Pupils are equal, round, and reactive to light  Cardiovascular:      Rate and Rhythm: Regular rhythm  Tachycardia present  Heart sounds: Normal heart sounds  No murmur heard  No friction rub  Comments: Tachycardic in upper 110s to 120s  Pulmonary:      Effort: Pulmonary effort is normal  No respiratory distress  Breath sounds: Normal breath sounds  No wheezing or rales  Abdominal:      General: Bowel sounds are normal  There is no distension  Palpations: Abdomen is soft  Tenderness: There is no abdominal tenderness  Musculoskeletal:         General: Normal range of motion  Cervical back: Normal range of motion and neck supple  Right lower leg: No edema  Left lower leg: No edema  Skin:     General: Skin is warm  Capillary Refill: Capillary refill takes less than 2 seconds  Neurological:      Mental Status: She is alert and oriented to person, place, and time  Coordination: Coordination normal    Psychiatric:         Behavior: Behavior normal          Thought Content:  Thought content normal          Judgment: Judgment normal          ED Medications  Medications   sodium chloride (PF) 0 9 % injection 3 mL (has no administration in time range)   buPROPion (WELLBUTRIN XL) 24 hr tablet 300 mg (300 mg Oral Given 7/73/17 5966)   folic acid (FOLVITE) tablet 1 mg (1 mg Oral Given 8/26/21 0823)   thiamine tablet 100 mg (100 mg Oral Given 8/26/21 0823)   potassium chloride (K-DUR,KLOR-CON) CR tablet 40 mEq (40 mEq Oral Not Given 8/26/21 0824)   ondansetron (ZOFRAN) injection 4 mg ( Intravenous Canceled Entry 8/26/21 0834)   acetaminophen (TYLENOL) tablet 650 mg (has no administration in time range)   buprenorphine-naloxone (Suboxone) film 8 mg (8 mg Sublingual Given 8/26/21 0824)   cefTRIAXone (ROCEPHIN) 1,000 mg in dextrose 5 % 50 mL IVPB (has no administration in time range) multi-electrolyte (PLASMALYTE-A/ISOLYTE-S PH 7 4) IV solution (125 mL/hr Intravenous New Bag 8/26/21 0824)   ferrous sulfate tablet 325 mg (has no administration in time range)   doxycycline hyclate (VIBRAMYCIN) capsule 100 mg (has no administration in time range)   ondansetron (FOR EMS ONLY) (ZOFRAN) 4 mg/2 mL injection 4 mg (0 mg Does not apply Given to EMS 8/25/21 1731)   multi-electrolyte (ISOLYTE-S PH 7 4) bolus 1,000 mL (0 mL Intravenous Stopped 8/25/21 1946)   acetaminophen (TYLENOL) tablet 975 mg (975 mg Oral Given 8/25/21 1846)   iohexol (OMNIPAQUE) 350 MG/ML injection (MULTI-DOSE) 85 mL (85 mL Intravenous Given 8/25/21 2005)   metroNIDAZOLE (FLAGYL) tablet 2,000 mg (2,000 mg Oral Given 8/26/21 0009)   ceftriaxone (ROCEPHIN) 1 g/50 mL in dextrose IVPB (1,000 mg Intravenous New Bag 8/26/21 0617)       Diagnostic Studies  Results Reviewed     Procedure Component Value Units Date/Time    NT-BNP PRO [[id number]]  (Abnormal) Collected: 08/26/21 0457    Lab Status: Final result Specimen: Blood from Arm, Left Updated: 08/26/21 0717     NT-proBNP 542 pg/mL     C-reactive protein [[id number]]  (Abnormal) Collected: 08/26/21 0457    Lab Status: Final result Specimen: Blood from Arm, Left Updated: 08/26/21 0717      0 mg/L     CBC and differential [[id number]]  (Abnormal) Collected: 08/26/21 0457    Lab Status: Final result Specimen: Blood from Arm, Left Updated: 08/26/21 0716     WBC 12 60 Thousand/uL      RBC 3 44 Million/uL      Hemoglobin 9 9 g/dL      Hematocrit 30 4 %      MCV 88 fL      MCH 28 8 pg      MCHC 32 6 g/dL      RDW 17 9 %      MPV 11 0 fL      Platelets 412 Thousands/uL     Narrative:      See Manual Diff Done Within 3 Days  This is an appended report  These results have been appended to a previously verified report      Comprehensive metabolic panel [[id number]]  (Abnormal) Collected: 08/26/21 0457    Lab Status: Final result Specimen: Blood from Arm, Left Updated: 08/26/21 5894     Sodium 136 mmol/L      Potassium 3 7 mmol/L      Chloride 109 mmol/L      CO2 23 mmol/L      ANION GAP 4 mmol/L      BUN 10 mg/dL      Creatinine 0 60 mg/dL      Glucose 106 mg/dL      Calcium 7 6 mg/dL      Corrected Calcium 9 0 mg/dL      AST 47 U/L      ALT 34 U/L      Alkaline Phosphatase 300 U/L      Total Protein 5 9 g/dL      Albumin 2 3 g/dL      Total Bilirubin 0 33 mg/dL      eGFR 126 ml/min/1 73sq m     Narrative:      [place] guidelines for Chronic Kidney Disease (CKD):     Stage 1 with normal or high GFR (GFR > 90 mL/min/1 73 square meters)    Stage 2 Mild CKD (GFR = 60-89 mL/min/1 73 square meters)    Stage 3A Moderate CKD (GFR = 45-59 mL/min/1 73 square meters)    Stage 3B Moderate CKD (GFR = 30-44 mL/min/1 73 square meters)    Stage 4 Severe CKD (GFR = 15-29 mL/min/1 73 square meters)    Stage 5 End Stage CKD (GFR <15 mL/min/1 73 square meters)  Note: GFR calculation is accurate only with a steady state creatinine    CK (with reflex to MB) [[id number]]  (Normal) Collected: 08/26/21 0457    Lab Status: Final result Specimen: Blood from Arm, Left Updated: 08/26/21 0702     Total  U/L     Procalcitonin Reflex [[id number]] Collected: 08/26/21 0501    Lab Status: No result Specimen: Blood from Arm, Left     Peripheral Smear [[id number]] Collected: 08/26/21 0501    Lab Status: No result Specimen: Blood from Arm, Left     Lactate dehydrogenase [[id number]]  (Normal) Collected: 08/25/21 1824    Lab Status: Final result Specimen: Blood from Arm, Left Updated: 08/26/21 0223      U/L     Troponin I [[id number]]  (Normal) Collected: 08/26/21 0009    Lab Status: Final result Specimen: Blood from Arm, Right Updated: 08/26/21 0042     Troponin I <0 02 ng/mL     CKMB [[id number]]  (Normal) Collected: 08/25/21 1824    Lab Status: Final result Specimen: Blood from Arm, Left Updated: 08/25/21 2348     CK-MB Index <1 0 %      CK-MB 1 1 ng/mL     NT-BNP PRO [[id number]]  (Abnormal) Collected: 08/25/21 1824    Lab Status: Final result Specimen: Blood from Arm, Left Updated: 08/25/21 2332     NT-proBNP 256 pg/mL     CK (with reflex to MB) [[id number]]  (Normal) Collected: 08/25/21 1824    Lab Status: Final result Specimen: Blood from Arm, Left Updated: 08/25/21 2332     Total  U/L     C-reactive protein [[id number]]  (Abnormal) Collected: 08/25/21 1824    Lab Status: Final result Specimen: Blood from Arm, Left Updated: 08/25/21 2332      0 mg/L     Blood culture #1 [[id number]] Collected: 08/25/21 1833    Lab Status: Preliminary result Specimen: Blood from Arm, Right Updated: 08/25/21 2201     Blood Culture Received in Microbiology Lab  Culture in Progress  Blood culture #2 [[id number]] Collected: 08/25/21 1841    Lab Status: Preliminary result Specimen: Blood from Arm, Left Updated: 08/25/21 2201     Blood Culture Received in Microbiology Lab  Culture in Progress  CBC and differential [[id number]]  (Abnormal) Collected: 08/25/21 1824    Lab Status: Final result Specimen: Blood from Arm, Left Updated: 08/25/21 2152     WBC 10 41 Thousand/uL      RBC 3 36 Million/uL      Hemoglobin 9 8 g/dL      Hematocrit 30 1 %      MCV 90 fL      MCH 29 2 pg      MCHC 32 6 g/dL      RDW 17 6 %      MPV 10 6 fL      Platelets 833 Thousands/uL     Narrative: This is an appended report  These results have been appended to a previously verified report      Manual Differential(PHLEBS Do Not Order) [[id number]]  (Abnormal) Collected: 08/25/21 1824    Lab Status: Final result Specimen: Blood from Arm, Left Updated: 08/25/21 2152     Segmented % 33 %      Bands % 55 %      Lymphocytes % 4 %      Monocytes % 2 %      Eosinophils, % 0 %      Basophils % 0 %      Metamyelocytes% 3 %      Myelocytes % 1 %      Atypical Lymphocytes % 2 %      Absolute Neutrophils 9 16 Thousand/uL      Lymphocytes Absolute 0 42 Thousand/uL      Monocytes Absolute 0 21 Thousand/uL      Eosinophils Absolute 0 00 Thousand/uL Basophils Absolute 0 00 Thousand/uL      Total Counted --     RBC Morphology Present     Polychromasia Present     Platelet Estimate Adequate     Artifact Present    Urine Microscopic [[id number]]  (Abnormal) Collected: 08/25/21 1917    Lab Status: Edited Result - FINAL Specimen: Urine, Clean Catch Updated: 08/25/21 2130     RBC, UA 2-4 /hpf      WBC, UA 30-50 /hpf      Epithelial Cells Moderate /hpf      Bacteria, UA Innumerable /hpf      OTHER OBSERVATIONS Trichomonas Organisms Present    Urine culture [[id number]] Collected: 08/25/21 1917    Lab Status:  In process Specimen: Urine, Clean Catch Updated: 08/25/21 2127    UA w Reflex to Microscopic w Reflex to Culture [[id number]]  (Abnormal) Collected: 08/25/21 1917    Lab Status: Final result Specimen: Urine, Clean Catch Updated: 08/25/21 1957     Color, UA Yellow     Clarity, UA Turbid     Specific [place], UA 1 013     pH, UA 5 5     Leukocytes, UA Large     Nitrite, UA Negative     Protein, UA Negative mg/dl      Glucose, UA Negative mg/dl      Ketones, UA Negative mg/dl      Urobilinogen, UA 0 2 E U /dl      Bilirubin, UA Negative     Blood, UA Small    Novel Coronavirus (Covid-19),PCR [facility] - 2 Hour Stat [[id number]]  (Abnormal) Collected: 08/25/21 1824    Lab Status: Final result Specimen: Nares from Nose Updated: 08/25/21 1954     SARS-CoV-2 Positive    Narrative:           POCT pregnancy, urine [[id number]]  (Normal) Resulted: 08/25/21 1921    Lab Status: Final result Updated: 08/25/21 1921     EXT PREG TEST UR (Ref: Negative) Negative     Control Valid    Urine Macroscopic, POC [[id number]]  (Abnormal) Collected: 08/25/21 1919    Lab Status: Final result Specimen: Urine Updated: 08/25/21 1921     Color, UA Yellow     Clarity, UA Clear     pH, UA 5 5     Leukocytes, UA Trace     Nitrite, UA Negative     Protein, UA Negative mg/dl      Glucose, UA Negative mg/dl      Ketones, UA Negative mg/dl      Urobilinogen, UA 0 2 E U /dl      Bilirubin, UA Negative Blood, UA Small     Specific [place], UA 1 015    Narrative:      CLINITEK RESULT    Procalcitonin with AM Reflex [[id number]]  (Abnormal) Collected: 08/25/21 1824    Lab Status: Final result Specimen: Blood from Arm, Left Updated: 08/25/21 1917     Procalcitonin 3 06 ng/ml     APTT [[id number]]  (Normal) Collected: 08/25/21 1824    Lab Status: Final result Specimen: Blood from Arm, Left Updated: 08/25/21 1914     PTT 37 seconds     D-dimer, quantitative [[id number]]  (Abnormal) Collected: 08/25/21 1824    Lab Status: Final result Specimen: Blood from Arm, Left Updated: 08/25/21 1914     D-Dimer, Quant 2 79 ug/ml FEU     Protime-INR [[id number]]  (Abnormal) Collected: 08/25/21 1824    Lab Status: Final result Specimen: Blood from Arm, Left Updated: 08/25/21 1914     Protime 15 6 seconds      INR 1 24    TSH [[id number]]  (Abnormal) Collected: 08/25/21 1824    Lab Status: Final result Specimen: Blood from Arm, Left Updated: 08/25/21 1903     TSH 3RD GENERATON 0 335 uIU/mL     Narrative:      Patients undergoing fluorescein dye angiography may retain small amounts of fluorescein in the body for 48-72 hours post procedure  Samples containing fluorescein can produce falsely depressed TSH values  If the patient had this procedure,a specimen should be resubmitted post fluorescein clearance  Lactic Acid [[id number]]  (Normal) Collected: 08/25/21 1824    Lab Status: Final result Specimen: Blood from Arm, Left Updated: 08/25/21 1857     LACTIC ACID 0 9 mmol/L     Narrative:      Result may be elevated if tourniquet was used during collection      Comprehensive metabolic panel [[id number]]  (Abnormal) Collected: 08/25/21 1824    Lab Status: Final result Specimen: Blood from Arm, Left Updated: 08/25/21 1856     Sodium 137 mmol/L      Potassium 3 2 mmol/L      Chloride 109 mmol/L      CO2 21 mmol/L      ANION GAP 7 mmol/L      BUN 13 mg/dL      Creatinine 0 72 mg/dL      Glucose 92 mg/dL      Calcium 7 6 mg/dL      Corrected Calcium 9 0 mg/dL      AST 61 U/L      ALT 37 U/L      Alkaline Phosphatase 344 U/L      Total Protein 5 9 g/dL      Albumin 2 3 g/dL      Total Bilirubin 0 64 mg/dL      eGFR 116 ml/min/1 73sq m     Narrative:      [place] guidelines for Chronic Kidney Disease (CKD):     Stage 1 with normal or high GFR (GFR > 90 mL/min/1 73 square meters)    Stage 2 Mild CKD (GFR = 60-89 mL/min/1 73 square meters)    Stage 3A Moderate CKD (GFR = 45-59 mL/min/1 73 square meters)    Stage 3B Moderate CKD (GFR = 30-44 mL/min/1 73 square meters)    Stage 4 Severe CKD (GFR = 15-29 mL/min/1 73 square meters)    Stage 5 End Stage CKD (GFR <15 mL/min/1 73 square meters)  Note: GFR calculation is accurate only with a steady state creatinine    Lipase [[id number]]  (Abnormal) Collected: 08/25/21 1824    Lab Status: Final result Specimen: Blood from Arm, Left Updated: 08/25/21 1856     Lipase 36 u/L                  CTA ED chest PE Study   Final Result by Bin Mcknight MD (08/25 2027)      1  Multifocal patchy and groundglass airspace opacity, particularly in the right upper and lower lobes In the setting of clinically suspected/proven COVID-19, the above lung parenchymal findings on CT indicate intermediate confidence level for COVID-19       2   No acute pulmonary embolism  3   Partially imaged liver and spleen appear enlarged  If there is clinical concern, this can be further evaluated with ultrasound  The study was marked in [facility] [facility] for immediate notification  Workstation performed: [id number]               Procedures  Procedures      ED Course                             SBIRT 20yo+      Most Recent Value   SBIRT (22 yo +)   In order to provide better care to our patients, we are screening all of our patients for alcohol and drug use  Would it be okay to ask you these screening questions?   No Filed at: 08/25/2021 1759          Kranthi' Criteria for PE      Most Recent Value   Luke Conrad Criteria for PE   Clinical signs and symptoms of DVT  0 Filed at: 08/25/2021 1924   PE is primary diagnosis or equally likely  0 Filed at: 08/25/2021 1924   HR >100  1 5 Filed at: 08/25/2021 1924   Immobilization at least 3 days or Surgery in the previous 4 weeks  1 5 Filed at: 08/25/2021 1924   Previous, objectively diagnosed PE or DVT  0 Filed at: 08/25/2021 1924   Hemoptysis  0 Filed at: 08/25/2021 1924   Malignancy with treatment within 6 months or palliative  0 Filed at: 08/25/2021 1924   Wells' Criteria Total  3 Filed at: 08/25/2021 1924            MDM  Number of Diagnoses or Management Options  Fever  Nausea & vomiting  Tachycardia  Trichomoniasis  Diagnosis management comments: Patient's presentation is concerning for covid-19 vs  Sepsis vs  Viral illness vs  Drug abuse  Sepsis workup ordered  Hgb stable making true hematemesis less likely  Lactate normal  Covid-19 positive  Patient remained tachycardic, and there was concern that she vomited her suboxone and could be experiencing withdrawal  Gave her suboxone dose in ED without improvement  D-dimer elevated and CTA PE study was performed, which showed evidence for COVID-19 infection without PE  Patient did not respond to fluid boluses or fever control for her tachycardia, which remained in 120s  Due to this, patient was admitted to [place] for further evaluation and care  Trichomonas was seen in urine, patient was treated with 2g of flagyl        Disposition  Final diagnoses:   Nausea & vomiting   Tachycardia   Fever   Trichomoniasis     Time reflects when diagnosis was documented in both MDM as applicable and the Disposition within this note     Time User Action Codes Description Comment    8/25/2021 10:45 PM Luis Brandi Add [R11 2] Nausea & vomiting     8/25/2021 10:45 PM Gerrianne Brandi Add [R00 0] Tachycardia     8/25/2021 10:46 PM Luis Brandi Add [R50 9] Fever     8/25/2021 10:47 PM Kirsten [address] [place] [address] [A59 9] Trichomoniasis ED Disposition     ED Disposition Condition Date/Time Comment    Admit Stable Wed Aug 25, 2021 10:45 PM Case was discussed with SOD and the patient's admission status was agreed to be Admission Status: observation status to the service of Dr Flor Gambino   Follow-up Information    None         Current Discharge Medication List      CONTINUE these medications which have NOT CHANGED    Details   buPROPion (WELLBUTRIN XL) 300 mg 24 hr tablet Take 300 mg by mouth every morning      Cholecalciferol 25 MCG (1000 UT) tablet Take 1,000 Units by mouth daily      folic acid (FOLVITE) 1 mg tablet Take 1 mg by mouth daily      thiamine 100 MG tablet Take 100 mg by mouth           No discharge procedures on file  PDMP Review     None           ED Provider  Attending physically available and evaluated [facility]  I managed the patient along with the ED Attending      Electronically Signed by         Germaine Lanza MD  08/26/21 40 Amparo Rico MD  08/26/21 1082

## 2021-08-26 NOTE — PLAN OF CARE
Problem: Potential for Falls  Goal: Patient will remain free of falls  Description: INTERVENTIONS:  - Educate patient/family on patient safety including physical limitations  - Instruct patient to call for assistance with activity   - Consult OT/PT to assist with strengthening/mobility   - Keep Call bell within reach  - Keep bed low and locked with side rails adjusted as appropriate  - Keep care items and personal belongings within reach  - Initiate and maintain comfort rounds  - Make Fall Risk Sign visible to staff  - Apply yellow socks and bracelet for high fall risk patients  - Consider moving patient to room near nurses station  Outcome: Progressing     Problem: PAIN - ADULT  Goal: Verbalizes/displays adequate comfort level or baseline comfort level  Description: Interventions:  - Encourage patient to monitor pain and request assistance  - Assess pain using appropriate pain scale  - Administer analgesics based on type and severity of pain and evaluate response  - Implement non-pharmacological measures as appropriate and evaluate response  - Consider cultural and social influences on pain and pain management  - Notify physician/advanced practitioner if interventions unsuccessful or patient reports new pain  Outcome: Progressing     Problem: INFECTION - ADULT  Goal: Absence or prevention of progression during hospitalization  Description: INTERVENTIONS:  - Assess and monitor for signs and symptoms of infection  - Monitor lab/diagnostic results  - Monitor all insertion sites, i e  indwelling lines, tubes, and drains  - Monitor endotracheal if appropriate and nasal secretions for changes in amount and color  - [place] appropriate cooling/warming therapies per order  - Administer medications as ordered  - Instruct and encourage patient and family to use good hand hygiene technique  - Identify and instruct in appropriate isolation precautions for identified infection/condition  Outcome: Progressing  Goal: Absence of fever/infection during neutropenic period  Description: INTERVENTIONS:  - Monitor WBC    Outcome: Progressing     Problem: SAFETY ADULT  Goal: Patient will remain free of falls  Description: INTERVENTIONS:  - Educate patient/family on patient safety including physical limitations  - Instruct patient to call for assistance with activity   - Consult OT/PT to assist with strengthening/mobility   - Keep Call bell within reach  - Keep bed low and locked with side rails adjusted as appropriate  - Keep care items and personal belongings within reach  - Initiate and maintain comfort rounds  - Make Fall Risk Sign visible to staff  - Apply yellow socks and bracelet for high fall risk patients  - Consider moving patient to room near nurses station  Outcome: Progressing  Goal: Maintain or return to baseline ADL function  Description: INTERVENTIONS:  -  Assess patient's ability to carry out ADLs; assess patient's baseline for ADL function and identify physical deficits which impact ability to perform ADLs (bathing, care of mouth/teeth, toileting, grooming, dressing, etc )  - Assess/evaluate cause of self-care deficits   - Assess range of motion  - Assess patient's mobility; develop plan if impaired  - Assess patient's need for assistive devices and provide as appropriate  - Encourage maximum independence but intervene and supervise when necessary  - Involve family in performance of ADLs  - Assess for home care needs following discharge   - Consider OT consult to assist with ADL evaluation and planning for discharge  - Provide patient education as appropriate  Outcome: Progressing  Goal: Maintains/Returns to pre admission functional level  Description: INTERVENTIONS:  - Perform BMAT or MOVE assessment daily    - Set and communicate daily mobility goal to care team and patient/family/caregiver     - Collaborate with rehabilitation services on mobility goals if consulted  - Dangle patient 3 times a day  - Stand patient 3 times a day  - Ambulate patient 3 times a day  - Out of bed to chair 3 times a day   - Out of bed for meals 3 times a day  - Out of bed for toileting  - Record patient progress and toleration of activity level   Outcome: Progressing     Problem: DISCHARGE PLANNING  Goal: Discharge to home or other facility with appropriate resources  Description: INTERVENTIONS:  - Identify barriers to discharge w/patient and caregiver  - Arrange for needed discharge resources and transportation as appropriate  - Identify discharge learning needs (meds, wound care, etc )  - Arrange for interpretive services to assist at discharge as needed  - Refer to Case Management Department for coordinating discharge planning if the patient needs post-hospital services based on physician/advanced practitioner order or complex needs related to functional status, cognitive ability, or social support system  Outcome: Progressing     Problem: Knowledge Deficit  Goal: Patient/family/caregiver demonstrates understanding of disease process, treatment plan, medications, and discharge instructions  Description: Complete learning assessment and assess knowledge base  Interventions:  - Provide teaching at level of understanding  - Provide teaching via preferred learning methods  Outcome: Progressing     Problem: Nutrition/Hydration-ADULT  Goal: Nutrient/Hydration intake appropriate for improving, restoring or maintaining nutritional needs  Description: Monitor and assess patient's nutrition/hydration status for malnutrition  Collaborate with interdisciplinary team and initiate plan and interventions as ordered  Monitor patient's weight and dietary intake as ordered or per policy  Utilize nutrition screening tool and intervene as necessary  Determine patient's food preferences and provide high-protein, high-caloric foods as appropriate       INTERVENTIONS:  - Monitor oral intake, urinary output, labs, and treatment plans  - Assess nutrition and hydration status and recommend course of action  - Evaluate amount of meals eaten  - Assist patient with eating if necessary   - Allow adequate time for meals  - Recommend/ encourage appropriate diets, oral nutritional supplements, and vitamin/mineral supplements  - Order, calculate, and assess calorie counts as needed  - Recommend, monitor, and adjust tube feedings and TPN/PPN based on assessed needs  - Assess need for intravenous fluids  - Provide specific nutrition/hydration education as appropriate  - Include patient/family/caregiver in decisions related to nutrition  Outcome: Progressing

## 2021-08-26 NOTE — PROGRESS NOTES
INTERNAL MEDICINE RESIDENCY SENIOR ADMISSION NOTE     Name: Marycarmen Shaw   Age & Sex: 31 y o  female   MRN: [id number]  Unit/Bed#: -01   Encounter: [id number]  Primary Care Provider: No primary care provider on file  Admit to team: SOD Team A    Code Status: Level 1 - Full Code  Admission Status: INPATIENT   Disposition: Patient requires Med/Surg  Expected Length of Stay: < 2 night    Principal Problem:    COVID-19  Active Problems:    Vomiting    Opioid dependence ([address] )    Tobacco dependence    Trichimoniasis    Hypokalemia    Anemia      Patient seen and examined  Reviewed H&P per Dr Yuen Setting  Agree with the assessment and plan with any exception/addition as noted below:  Patient is a 69-year-old female with past medical history significant for opiate abuse and tobacco dependence  Patient presented from detox facility via EMS with complaints of nausea vomiting that she began experiencing 2-3 days ago that resolved however recurred yesterday morning  Vital signs on arrival to ED with notable for tachycardia 136 in fever 100 5° F  Room air  COVID test positive  EKG shows sinus tachycardia  Hypokalemia of 3 2 procalcitonin of 3 06,  D-dimer 2 79  CT PE study was negative however demonstrated multifocal patchy and ground-glass opacities     Patient started on mild covid pathway and antibiotics (ceftriaxone and doxycycline) secondary to suspected COVID pneumonia    Rest of assessment and plan by Dr Antonieta Menjivar MD  Internal Medicine Residency, PGY-3  [facility]

## 2021-08-26 NOTE — H&P
INTERNAL MEDICINE RESIDENCY ADMISSION H&P     Name: Linda Manuel   Age & Sex: 31 y o  female   MRN: [id number]  Unit/Bed#: -01   Encounter: [id number]  Primary Care Provider: No primary care provider on file  Code Status: Level 1 - Full Code  Admission Status: OBSERVATION  Disposition: Patient requires Med/Surg    Admit to team: SOD Team A    ASSESSMENT/PLAN     Principal Problem:    COVID-19  Active Problems:    Vomiting    Opioid dependence (HCC)    Tobacco dependence    Trichimoniasis    Hypokalemia    Anemia      Anemia  Assessment & Plan  Patient vomited red colored emesis on 2 occasions  However she reportedly had drink red color Gatorade and It was unclear whether this was true hematemesis  Current hemoglobin of 9 8  Per chart review her baseline appears to be from 11 5-12  She is not actively bleeding right now  Lab Results   Component Value Date/Time    HGB 9 8 (L) 08/25/2021 06:24 PM    RDW 17 6 (H) 08/25/2021 06:24 PM    HCT 30 1 (L) 08/25/2021 06:24 PM    MCV 90 08/25/2021 06:24 PM    MCHC 32 6 08/25/2021 06:24 PM     · Iron panel pending  · Peripheral smear pending  · Patient is not actively bleeding right now and consider outpatient follow-up for further eval      Hypokalemia  Assessment & Plan  Lab Results   Component Value Date/Time    K 3 2 (L) 08/25/2021 06:24 PM     Hypokalemia in the setting of emesis  2/2 volume depletion   · Potassium repletion with K-dur 40 mEq x 2 doses  · Trend potassium levels  · Pending magnesium levels  Replete and recheck if necessary      Hepatitis C  Assessment & Plan  Patient reports testing positive for Hep C in 2015  She has not been treated for it  Last known viral load [id number] (7/21/20)  She states she was waiting to get clean before seeking treatment  · Hepatitis Panel with viral load       Trichimoniasis  Assessment & Plan  UA was significant for moderate WBC and innumeral bacteria, positive for Trichomonas    Patient denied symptoms of vaginitis  She was given 2 g of Flagyl in the ED  · Outpatient follow-up after 2 weeks for for repeat testing with NAAT to confirm eradication    Tobacco dependence  Assessment & Plan  Patient smokes half pack per day for the past 5 years  · Patient offered nicotine replacement therapy  Patient refused  · Counseled on tobacco cessation    Opioid dependence [facility])  Assessment & Plan  Patient has a history of opioid abuse  Currently in rehab at [facility]  No signs of withdrawal   · Continue Suboxone 8 mg     Vomiting  Assessment & Plan  2 episodes of vomiting today prior to transfer to [facility] for evaluation 2/2 COVID infection vs gabapentin side effect   · Continue Zofran IV p r n       * COVID-19  Assessment & Plan  Lab Results   Component Value Date/Time    TROPONINI <0 02 08/26/2021 12:09 AM    CKTOTAL 178 08/25/2021 06:24 PM    NTBNP 256 (H) 08/25/2021 06:24 PM    PROCALCITONI 3 06 (H) 08/25/2021 06:24 PM    LACTICACID 0 9 08/25/2021 06:24 PM    WBC 10 41 (H) 08/25/2021 06:24 PM     76-year-old female with symptom onset, including nausea and vomiting, 1 day prior tested positive for COVID  For was significant for shortness of breath, upper respiratory tract infection symptoms, chest pain, abdominal pain, diarrhea, leg swelling  Patient is tachycardic but on room air with SpO2 97%  EKG demonstrates sinus tachycardia  CTA chest findings are indicative of acute COVID infection, and negative for PE  However procalcitonin is elevated 3 06  ? superficial bacterial infection in the setting of COVID pneumonia  · Mild COVID pathway  · Therapeutic enoxaparin for anticoagulation  · Patient apparently started on ceftriaxone 1000 mg and doxycycline 100 mg p o     · Continue to trend procalcitonin and lactate          VTE Pharmacologic Prophylaxis: Enoxaparin (Lovenox)  VTE Mechanical Prophylaxis: sequential compression device    CHIEF COMPLAINT     Chief Complaint   Patient presents with   Isabelle Gonzalez Vomiting     pt from [facility] detox center, reported nausea and vomiting since 1100, other resident at center diagnosed with covid 5 days ago, pt had a negative test yesterday, also just started gabapentin 300mg TID and thinks this may be related to the medication      HISTORY OF PRESENT ILLNESS     50-year-old female with Hepatitis C, tobacco dependence, and history of opioid dependence presented via EMS from [facility] detox center with with complaints of nausea and vomiting  Last week patient had nausea and vomiting for 2-3 days which was attributed to a stomach bug " She vomited red colored emesis on 2 occasions  However she reportedly had drink red color Gatorade and It was unclear whether this was true hematemesis  Patient's symptoms at that time resolved with symptomatic management  Yesterday morning, patient again started having nausea and 3 episodes of nonbloody emesis  Notably a resident at the detox center tested positive for COVID 5 days ago  However patient had a negative COVID test yesterday  She also reports starting gabapentin 300 mg t i d  2 days prior and she thinks that her symptoms may be related to her medication  Patient denied abdominal pain, diarrhea, constipation, or chest pain, shortness of breath, lower leg swelling  Upon arrival to the ED patient was found to be tachycardic (), with temperature of 100 5°  Respiratory rate and blood pressure were within normal limits  SpO2 96% on room air  COVID test was positive  Labs were significant for potassium 3 2, WBC 10 41, procalcitonin 3 06, , and D-dimer 2 79    CTA chest was negative for acute PE  There was evidence of multifocal patchy and ground-glass airspace opacity in the right upper and lower lobes, indicative of COVID-19  UA was significant for moderate WBC and innumeral bacteria, positive for Trichomonas  Patient was given 1 dose of 2 g Flagyl       Patient was admitted observation of worsening COVID symptoms included needing supplemental oxygen and correction of electrolytes  Rule out superficial bacterial infection      REVIEW OF SYSTEMS     Review of Systems   Constitutional: Negative for chills and fever  HENT: Negative for ear pain and sore throat  Eyes: Negative for pain and visual disturbance  Respiratory: Negative for cough and shortness of breath  Cardiovascular: Negative for chest pain and palpitations  Gastrointestinal: Positive for nausea and vomiting  Negative for abdominal pain and diarrhea  Genitourinary: Negative for dysuria and hematuria  Musculoskeletal: Negative for arthralgias and back pain  Skin: Negative for color change and rash  Neurological: Negative for seizures and syncope  All other systems reviewed and are negative  OBJECTIVE     Vitals:    21 2100 21 0015 21 0030 21 0300   BP: 104/61 98/57 100/63    BP Location: Right arm Right arm Right arm    Pulse: (!) 122 (!) 121 (!) 123    Resp: 18 16 16    Temp:       TempSrc:       SpO2: 97% 98% 96%    Weight:    53 5 kg (118 lb)   Height:    5' 9" (1 753 m)      Temperature:   Temp (24hrs), Av 5 °F (38 1 °C), Min:100 5 °F (38 1 °C), Max:100 5 °F (38 1 °C)    Temperature: 100 5 °F (38 1 °C)  Intake & Output:  I/O        07 -  0700  07 -  0700    I V  (mL/kg)  1000 (18 7)    Total Intake(mL/kg)  1000 (18 7)    Net  +1000              Weights:   IBW (Ideal Body Weight): 66 2 kg    Body mass index is 17 43 kg/m²  Weight (last 2 days)     Date/Time   Weight    21 0300   53 5 (118)    21 1725   53 5 (118)            Physical Exam  Vitals and nursing note reviewed  Constitutional:       General: She is not in acute distress  Appearance: She is well-developed  HENT:      Head: Normocephalic and atraumatic  Eyes:      Conjunctiva/sclera: Conjunctivae normal    Cardiovascular:      Rate and Rhythm: Normal rate and regular rhythm  Heart sounds: No murmur heard  Pulmonary:      Effort: Pulmonary effort is normal  No respiratory distress  Breath sounds: Normal breath sounds  Abdominal:      Palpations: Abdomen is soft  Tenderness: There is no abdominal tenderness  Musculoskeletal:      Cervical back: Neck supple  Skin:     General: Skin is warm and dry  Neurological:      Mental Status: She is alert  PAST MEDICAL HISTORY     Past Medical History:   Diagnosis Date    Opiate abuse, continuous ([address] )      PAST SURGICAL HISTORY   History reviewed  No pertinent surgical history  SOCIAL & FAMILY HISTORY     Social History     Substance and Sexual Activity   Alcohol Use Not Currently     Substance and Sexual Activity   Alcohol Use Not Currently        Substance and Sexual Activity   Drug Use Not Currently    Comment: in recovery from opiate use     Social History     Tobacco Use   Smoking Status Current Every Day Smoker    Packs/day: 0 50     History reviewed  No pertinent family history  LABORATORY DATA     Labs: I have personally reviewed pertinent reports  Results from last 7 days   Lab Units 08/25/21  1824   WBC Thousand/uL 10 41*   HEMOGLOBIN g/dL 9 8*   HEMATOCRIT % 30 1*   PLATELETS Thousands/uL 269   MONO PCT % 2*      Results from last 7 days   Lab Units 08/25/21  1824   POTASSIUM mmol/L 3 2*   CHLORIDE mmol/L 109*   CO2 mmol/L 21   BUN mg/dL 13   CREATININE mg/dL 0 72   CALCIUM mg/dL 7 6*   ALK PHOS U/L 344*   ALT U/L 37   AST U/L 61*              Results from last 7 days   Lab Units 08/25/21  1824   INR  1 24*   PTT seconds 37     Results from last 7 days   Lab Units 08/25/21  1824   LACTIC ACID mmol/L 0 9     Results from last 7 days   Lab Units 08/26/21  0009   TROPONIN I ng/mL <0 02     Micro:  Lab Results   Component Value Date    BLOODCX Received in Microbiology Lab  Culture in Progress  08/25/2021    BLOODCX Received in Microbiology Lab  Culture in Progress   08/25/2021     IMAGING & DIAGNOSTIC TESTS Imaging: I have personally reviewed pertinent reports  CTA ED chest PE Study    Result Date: 8/25/2021  Impression: 1  Multifocal patchy and groundglass airspace opacity, particularly in the right upper and lower lobes In the setting of clinically suspected/proven COVID-19, the above lung parenchymal findings on CT indicate intermediate confidence level for COVID-19  2   No acute pulmonary embolism  3   Partially imaged liver and spleen appear enlarged  If there is clinical concern, this can be further evaluated with ultrasound  The study was marked in [facility] for immediate notification  Workstation performed: [id number]     EKG, Pathology, and Other Studies: I have personally reviewed pertinent reports  ALLERGIES   No Known Allergies  MEDICATIONS PRIOR TO ARRIVAL     Prior to Admission medications    Medication Sig Start Date End Date Taking?  Authorizing Provider   buPROPion (WELLBUTRIN XL) 300 mg 24 hr tablet Take 300 mg by mouth every morning   Yes Historical Provider, MD   Cholecalciferol 25 MCG (1000 UT) tablet Take 1,000 Units by mouth daily 8/3/21  Yes Historical Provider, MD   folic acid (FOLVITE) 1 mg tablet Take 1 mg by mouth daily 8/2/21  Yes Historical Provider, MD   thiamine 100 MG tablet Take 100 mg by mouth 8/2/21  Yes Historical Provider, MD     MEDICATIONS ADMINISTERED IN LAST 24 HOURS     Medication Administration - last 24 hours from 08/25/2021 0620 to 08/26/2021 0620       Date/Time Order Dose Route Action Action by     08/25/2021 1731 ondansetron (FOR EMS ONLY) (ZOFRAN) 4 mg/2 mL injection 4 mg 0 mg Does not apply Given to [facility], RN     08/25/2021 1946 multi-electrolyte (ISOLYTE-S PH 7 4) bolus 1,000 mL 0 mL Intravenous Stopped Jonah Alberts RN     08/25/2021 1846 multi-electrolyte (ISOLYTE-S PH 7 4) bolus 1,000 mL 1,000 mL Intravenous New Bag Jonah Alberts RN     08/25/2021 1846 acetaminophen (TYLENOL) tablet 975 mg 975 mg Oral Given Jonah Alberts RN 08/25/2021 2005 iohexol (OMNIPAQUE) 350 MG/ML injection (MULTI-DOSE) 85 mL 85 mL Intravenous Given Marc Ibarraa     08/25/2021 2132 buprenorphine-naloxone (Suboxone) film 8 mg 8 mg Sublingual Given Marisabel Crabtree RN     08/26/2021 0009 metroNIDAZOLE (FLAGYL) tablet 2,000 mg 2,000 mg Oral Given Marisabel Crabtree RN     08/26/2021 0612 enoxaparin (LOVENOX) subcutaneous injection 50 mg 50 mg Subcutaneous Not Given David Mccarthy RN     08/26/2021 0255 doxycycline hyclate (VIBRAMYCIN) capsule 100 mg 100 mg Oral Given David Mccarthy RN     08/26/2021 9664 ondansetron (ZOFRAN) injection 4 mg 4 mg Intravenous Given David Mccarthy RN        CURRENT MEDICATIONS     Current Facility-Administered Medications   Medication Dose Route Frequency Provider Last Rate    buprenorphine-naloxone  8 mg Sublingual Daily [facility], DO      buPROPion  300 mg Oral QAM [facility], DO      cefTRIAXone  1,000 mg Intravenous Once [facility], DO      doxycycline hyclate  100 mg Oral Q12H [facility], DO      enoxaparin  1 mg/kg Subcutaneous Q12H [facility], DO      folic acid  1 mg Oral Daily [facility], DO      ondansetron  4 mg Intravenous Q8H PRN [facility], DO      potassium chloride  40 mEq Oral BID [facility], DO      sodium chloride (PF)  3 mL Intravenous Q1H PRN Jasmit Heather, DO      thiamine  100 mg Oral Daily [facility], DO          ondansetron, 4 mg, Q8H PRN  sodium chloride (PF), 3 mL, Q1H PRN        Admission Time  I spent 30 minutes admitting the patient  This involved direct patient contact where I performed a full history and physical, reviewing previous records, and reviewing laboratory and other diagnostic studies  Portions of the record may have been created with voice recognition software  Occasional wrong word or "sound a like" substitutions may have occurred due to the inherent limitations of voice recognition software    Read the chart carefully and recognize, using context, where substitutions have occurred     ==  Lilly Ceron, [address] [facility]  Internal Medicine Residency PGY-1

## 2021-08-26 NOTE — ASSESSMENT & PLAN NOTE
Patient vomited red colored emesis on 2 occasions  However she reportedly had drink red color Gatorade and It was unclear whether this was true hematemesis  Current hemoglobin of 10 6, improving  Per chart review her baseline appears to be from 11 5-12  She is not actively bleeding right now    Lab Results   Component Value Date/Time    HGB 10 6 (L) 08/31/2021 06:04 AM    RDW 18 8 (H) 08/31/2021 06:04 AM    HCT 32 5 (L) 08/31/2021 06:04 AM    MCV 87 08/31/2021 06:04 AM    MCHC 32 6 08/31/2021 06:04 AM     Plan:  · Ferrous sulfate 325 mg daily  · Patient is not actively bleeding right now and consider outpatient follow-up for further eval

## 2021-08-26 NOTE — ASSESSMENT & PLAN NOTE
Lab Results   Component Value Date/Time    PROCALCITONI 0 08 08/31/2021 06:04 AM    WBC 7 08 08/31/2021 06:04 AM     79-year-old female with symptom onset, including nausea and vomiting, 1 day prior tested positive for COVID  For was significant for shortness of breath, upper respiratory tract infection symptoms, chest pain, abdominal pain, diarrhea, leg swelling  Patient is tachycardic but on room air with SpO2 97%  EKG demonstrates sinus tachycardia  CTA chest findings are indicative of acute COVID infection, and negative for PE  However procalcitonin was elevated 3 06 on admission, increased to 6 13 after  Superficial bacterial infection in the setting of COVID pneumonia suspected  Plan:  · Mild COVID pathway   · Therapeutic enoxaparin for anticoagulation - discontinued  · Patient apparently started on ceftriaxone 1000 mg and doxycycline 100 mg p o  on admission admission - discontinued  · IV vancomycin and cefepime started on 8/26  · MRSA positive - likely chronic carrier  · Blood cultures remain negative   · Satting well on room air  · Procal 08/30 0 08, cefepime and vancomycin - discontinued  · Patient okay for DC pending parents schedule

## 2021-08-26 NOTE — PROGRESS NOTES
Pt continues to detach and reattach IV herself  Pt  educated multiple times about the high risk of infection associated with not cleaning her line before it is reattached

## 2021-08-26 NOTE — PROGRESS NOTES
Vancomycin IV Pharmacy-to-Dose Consultation    Nubia Silva is a 31 y o  female who is currently receiving Vancomycin IV with management by the Pharmacy Consult service  Relevant clinical data and objective history reviewed:  Temp Readings from Last 3 Encounters:   08/26/21 (!) 103 °F (39 4 °C) (Oral)     /73 (BP Location: Right arm)   Pulse (!) 130   Temp (!) 103 °F (39 4 °C) (Oral)   Resp 19   Ht 5' 9" (1 753 m)   Wt 53 5 kg (118 lb)   LMP 08/04/2021   SpO2 97%   BMI 17 43 kg/m²     I/O last 3 completed shifts: In: 1000 [I V :1000]  Out: -     Lab Results   Component Value Date/Time    BUN 10 08/26/2021 04:57 AM    WBC 12 60 (H) 08/26/2021 04:57 AM    HGB 9 9 (L) 08/26/2021 04:57 AM    HCT 30 4 (L) 08/26/2021 04:57 AM    MCV 88 08/26/2021 04:57 AM     08/26/2021 04:57 AM     Creatinine   Date Value Ref Range Status   08/26/2021 0 60 0 60 - 1 30 mg/dL Final     Comment:     Standardized to IDMS reference method   08/25/2021 0 72 0 60 - 1 30 mg/dL Final     Comment:     Standardized to IDMS reference method         Vancomycin Assessment:  Indication: other Concern for hospital acquired infection  Renal Function: Scr 0 6; CrCl > 100 ml/min  Potential Nephrotoxicity Factors:  Medications: IV contrast  Patient-Factors: none  Days of Therapy: 1  Goal Trough: 15-20 (appropriate for most indications)   Goal AUC(24h): 400-600    Vancomycin Plan:  New Dosing: start 1250 mg q8h   Predicted Trough: 16 9  Next Level: 8/27 @ 1500      Pharmacy will continue to follow closely for s/sx of nephrotoxicity, infusion reactions and appropriateness of therapy  BMP and CBC will be ordered per protocol  We will continue to follow the patient's culture results and clinical progress daily         Yuli Yu, PharmD  Pharmacist

## 2021-08-26 NOTE — ASSESSMENT & PLAN NOTE
Patient reports testing positive for Hep C in 2015  She has not been treated for it  Last known viral load [id number] (7/21/20)  She states she was waiting to get clean before seeking treatment     Hepatitis Panel with viral load     Plan:  · Hepatitis-C   · Outpatient GI/hepatologist follow-up

## 2021-08-26 NOTE — ASSESSMENT & PLAN NOTE
UA was significant for moderate WBC and innumeral bacteria, positive for Trichomonas  Patient denied symptoms of vaginitis  She was given 2 g of Flagyl in the ED        Plan:  · Outpatient follow-up after 2 weeks for for repeat testing with NAAT to confirm eradication

## 2021-08-27 LAB
ANION GAP SERPL CALCULATED.3IONS-SCNC: 5 MMOL/L (ref 4–13)
BASOPHILS # BLD AUTO: 0.03 THOUSANDS/ΜL (ref 0–0.1)
BASOPHILS NFR BLD AUTO: 0 % (ref 0–1)
BUN SERPL-MCNC: 9 MG/DL (ref 5–25)
CALCIUM SERPL-MCNC: 7.2 MG/DL (ref 8.3–10.1)
CHLORIDE SERPL-SCNC: 108 MMOL/L (ref 100–108)
CO2 SERPL-SCNC: 25 MMOL/L (ref 21–32)
CREAT SERPL-MCNC: 0.52 MG/DL (ref 0.6–1.3)
EOSINOPHIL # BLD AUTO: 0 THOUSAND/ΜL (ref 0–0.61)
EOSINOPHIL NFR BLD AUTO: 0 % (ref 0–6)
ERYTHROCYTE [DISTWIDTH] IN BLOOD BY AUTOMATED COUNT: 18.1 % (ref 11.6–15.1)
GFR SERPL CREATININE-BSD FRML MDRD: 132 ML/MIN/1.73SQ M
GLUCOSE SERPL-MCNC: 96 MG/DL (ref 65–140)
HCT VFR BLD AUTO: 29.2 % (ref 34.8–46.1)
HGB BLD-MCNC: 9.7 G/DL (ref 11.5–15.4)
HIV 1+2 AB+HIV1 P24 AG SERPL QL IA: NORMAL
IMM GRANULOCYTES # BLD AUTO: 0.1 THOUSAND/UL (ref 0–0.2)
IMM GRANULOCYTES NFR BLD AUTO: 1 % (ref 0–2)
LYMPHOCYTES # BLD AUTO: 0.65 THOUSANDS/ΜL (ref 0.6–4.47)
LYMPHOCYTES NFR BLD AUTO: 6 % (ref 14–44)
MCH RBC QN AUTO: 29 PG (ref 26.8–34.3)
MCHC RBC AUTO-ENTMCNC: 33.2 G/DL (ref 31.4–37.4)
MCV RBC AUTO: 87 FL (ref 82–98)
MONOCYTES # BLD AUTO: 0.29 THOUSAND/ΜL (ref 0.17–1.22)
MONOCYTES NFR BLD AUTO: 3 % (ref 4–12)
NEUTROPHILS # BLD AUTO: 10.72 THOUSANDS/ΜL (ref 1.85–7.62)
NEUTS SEG NFR BLD AUTO: 90 % (ref 43–75)
NRBC BLD AUTO-RTO: 0 /100 WBCS
PLATELET # BLD AUTO: 251 THOUSANDS/UL (ref 149–390)
PMV BLD AUTO: 11.2 FL (ref 8.9–12.7)
POTASSIUM SERPL-SCNC: 3.2 MMOL/L (ref 3.5–5.3)
PROCALCITONIN SERPL-MCNC: 4.71 NG/ML
RBC # BLD AUTO: 3.34 MILLION/UL (ref 3.81–5.12)
SODIUM SERPL-SCNC: 138 MMOL/L (ref 136–145)
TROPONIN I SERPL-MCNC: <0.02 NG/ML
WBC # BLD AUTO: 11.79 THOUSAND/UL (ref 4.31–10.16)

## 2021-08-27 PROCEDURE — 84484 ASSAY OF TROPONIN QUANT: CPT | Performed by: INTERNAL MEDICINE

## 2021-08-27 PROCEDURE — 99232 SBSQ HOSP IP/OBS MODERATE 35: CPT | Performed by: INTERNAL MEDICINE

## 2021-08-27 PROCEDURE — 85025 COMPLETE CBC W/AUTO DIFF WBC: CPT | Performed by: INTERNAL MEDICINE

## 2021-08-27 PROCEDURE — 87081 CULTURE SCREEN ONLY: CPT | Performed by: STUDENT IN AN ORGANIZED HEALTH CARE EDUCATION/TRAINING PROGRAM

## 2021-08-27 PROCEDURE — 84145 PROCALCITONIN (PCT): CPT | Performed by: INTERNAL MEDICINE

## 2021-08-27 PROCEDURE — 80048 BASIC METABOLIC PNL TOTAL CA: CPT | Performed by: INTERNAL MEDICINE

## 2021-08-27 RX ORDER — POTASSIUM CHLORIDE 20 MEQ/1
40 TABLET, EXTENDED RELEASE ORAL 2 TIMES DAILY
Status: COMPLETED | OUTPATIENT
Start: 2021-08-27 | End: 2021-08-27

## 2021-08-27 RX ORDER — VANCOMYCIN HYDROCHLORIDE 1 G/200ML
20 INJECTION, SOLUTION INTRAVENOUS EVERY 8 HOURS
Status: DISCONTINUED | OUTPATIENT
Start: 2021-08-27 | End: 2021-08-27

## 2021-08-27 RX ADMIN — CEFEPIME HYDROCHLORIDE 2000 MG: 2 INJECTION, POWDER, FOR SOLUTION INTRAVENOUS at 11:40

## 2021-08-27 RX ADMIN — CEFEPIME HYDROCHLORIDE 2000 MG: 2 INJECTION, POWDER, FOR SOLUTION INTRAVENOUS at 15:42

## 2021-08-27 RX ADMIN — BUPRENORPHINE AND NALOXONE 8 MG: 8; 2 FILM BUCCAL; SUBLINGUAL at 08:19

## 2021-08-27 RX ADMIN — VANCOMYCIN HYDROCHLORIDE 1250 MG: 10 INJECTION, POWDER, LYOPHILIZED, FOR SOLUTION INTRAVENOUS at 08:19

## 2021-08-27 RX ADMIN — THIAMINE HCL TAB 100 MG 100 MG: 100 TAB at 08:19

## 2021-08-27 RX ADMIN — ONDANSETRON 4 MG: 2 INJECTION INTRAMUSCULAR; INTRAVENOUS at 16:26

## 2021-08-27 RX ADMIN — ACETAMINOPHEN 650 MG: 325 TABLET, FILM COATED ORAL at 01:28

## 2021-08-27 RX ADMIN — ONDANSETRON 4 MG: 2 INJECTION INTRAMUSCULAR; INTRAVENOUS at 08:23

## 2021-08-27 RX ADMIN — ACETAMINOPHEN 650 MG: 325 TABLET, FILM COATED ORAL at 21:56

## 2021-08-27 RX ADMIN — POTASSIUM CHLORIDE 40 MEQ: 1500 TABLET, EXTENDED RELEASE ORAL at 08:18

## 2021-08-27 RX ADMIN — BUPRENORPHINE AND NALOXONE 8 MG: 8; 2 FILM BUCCAL; SUBLINGUAL at 18:33

## 2021-08-27 RX ADMIN — POTASSIUM CHLORIDE 40 MEQ: 1500 TABLET, EXTENDED RELEASE ORAL at 15:44

## 2021-08-27 RX ADMIN — CEFEPIME HYDROCHLORIDE 2000 MG: 2 INJECTION, POWDER, FOR SOLUTION INTRAVENOUS at 02:05

## 2021-08-27 RX ADMIN — FOLIC ACID 1 MG: 1 TABLET ORAL at 08:19

## 2021-08-27 RX ADMIN — ACETAMINOPHEN 650 MG: 325 TABLET, FILM COATED ORAL at 08:23

## 2021-08-27 RX ADMIN — ACETAMINOPHEN 650 MG: 325 TABLET, FILM COATED ORAL at 15:35

## 2021-08-27 RX ADMIN — ONDANSETRON 4 MG: 2 INJECTION INTRAMUSCULAR; INTRAVENOUS at 00:16

## 2021-08-27 RX ADMIN — VANCOMYCIN HYDROCHLORIDE 1250 MG: 10 INJECTION, POWDER, LYOPHILIZED, FOR SOLUTION INTRAVENOUS at 18:24

## 2021-08-27 RX ADMIN — CEFEPIME HYDROCHLORIDE 2000 MG: 2 INJECTION, POWDER, FOR SOLUTION INTRAVENOUS at 23:52

## 2021-08-27 RX ADMIN — BUPROPION HYDROCHLORIDE 300 MG: 150 TABLET, FILM COATED, EXTENDED RELEASE ORAL at 08:19

## 2021-08-27 NOTE — PROGRESS NOTES
Vancomycin Pharmacy Consult    Pro Dior is an 31 y o  female who is currently receiving IV vancomycin for PNA  Vancomycin Assessment:  1  ID Consult: No  2  Cultures:   8/25 COVID19: neg  8/25 Blood 2/2: NGTD  8/25 Urine: in process  3  Procalcitonin:   8/25: 3 06  8/26: 6 13  8/27: 4 71  4  Renal Function:   SCr: 0 52  CrCl: >100 mL/min  UOP: n/a  5  Days of Therapy: 2  6  Current Dose: 1250 mg IV q8h  7  Last Level: n/a  8  Goal AUC(24h): 400-600  9  Goal Random/Trough: 15-20    Vancomycin Plan:  1  Evaluation: continue current regimen  2  New Dosing: continue 1250 mg IV q8h  Predicted Trough / AUC(24h): 19 7 / 776  3  Next Level: trough 8/27 at 1600      Pharmacy will continue to follow closely for s/sx of nephrotoxicity, infusion reactions, and appropriateness of therapy  BMP and CBC will be ordered per protocol  We will continue to follow the patients culture results and clinical progress daily  Kerri Hampton, PharmD  Internal Medicine Clinical Pharmacist Specialist  [phone]  [facility]

## 2021-08-27 NOTE — PLAN OF CARE
Problem: PAIN - ADULT  Goal: Verbalizes/displays adequate comfort level or baseline comfort level  Description: Interventions:  - Encourage patient to monitor pain and request assistance  - Assess pain using appropriate pain scale  - Administer analgesics based on type and severity of pain and evaluate response  - Implement non-pharmacological measures as appropriate and evaluate response  - Consider cultural and social influences on pain and pain management  - Notify physician/advanced practitioner if interventions unsuccessful or patient reports new pain  Outcome: Progressing     Problem: INFECTION - ADULT  Goal: Absence or prevention of progression during hospitalization  Description: INTERVENTIONS:  - Assess and monitor for signs and symptoms of infection  - Monitor lab/diagnostic results  - Monitor all insertion sites, i e  indwelling lines, tubes, and drains  - Monitor endotracheal if appropriate and nasal secretions for changes in amount and color  - [place] appropriate cooling/warming therapies per order  - Administer medications as ordered  - Instruct and encourage patient and family to use good hand hygiene technique  - Identify and instruct in appropriate isolation precautions for identified infection/condition  Outcome: Progressing  Goal: Absence of fever/infection during neutropenic period  Description: INTERVENTIONS:  - Monitor WBC    Outcome: Progressing     Goal: Maintain or return to baseline ADL function  Description: INTERVENTIONS:  -  Assess patient's ability to carry out ADLs; assess patient's baseline for ADL function and identify physical deficits which impact ability to perform ADLs (bathing, care of mouth/teeth, toileting, grooming, dressing, etc )  - Assess/evaluate cause of self-care deficits   - Assess range of motion  - Assess patient's mobility; develop plan if impaired  - Assess patient's need for assistive devices and provide as appropriate  - Encourage maximum independence but intervene and supervise when necessary  - Involve family in performance of ADLs  - Assess for home care needs following discharge   - Consider OT consult to assist with ADL evaluation and planning for discharge  - Provide patient education as appropriate  Outcome: Progressing  Goal: Maintains/Returns to pre admission functional level  Description: INTERVENTIONS:  - Perform BMAT or MOVE assessment daily    - Set and communicate daily mobility goal to care team and patient/family/caregiver  - Collaborate with rehabilitation services on mobility goals if consulted  - Ambulate patient 3 times a day    - Out of bed for toileting  - Record patient progress and toleration of activity level   Outcome: Progressing     Problem: DISCHARGE PLANNING  Goal: Discharge to home or other facility with appropriate resources  Description: INTERVENTIONS:  - Identify barriers to discharge w/patient and caregiver  - Arrange for needed discharge resources and transportation as appropriate  - Identify discharge learning needs (meds, wound care, etc )  - Arrange for interpretive services to assist at discharge as needed  - Refer to Case Management Department for coordinating discharge planning if the patient needs post-hospital services based on physician/advanced practitioner order or complex needs related to functional status, cognitive ability, or social support system  Outcome: Progressing     Problem: Knowledge Deficit  Goal: Patient/family/caregiver demonstrates understanding of disease process, treatment plan, medications, and discharge instructions  Description: Complete learning assessment and assess knowledge base    Interventions:  - Provide teaching at level of understanding  - Provide teaching via preferred learning methods  Outcome: Progressing     Problem: Nutrition/Hydration-ADULT  Goal: Nutrient/Hydration intake appropriate for improving, restoring or maintaining nutritional needs  Description: Monitor and assess patient's nutrition/hydration status for malnutrition  Collaborate with interdisciplinary team and initiate plan and interventions as ordered  Monitor patient's weight and dietary intake as ordered or per policy  Utilize nutrition screening tool and intervene as necessary  Determine patient's food preferences and provide high-protein, high-caloric foods as appropriate       INTERVENTIONS:  - Monitor oral intake, urinary output, labs, and treatment plans  - Assess nutrition and hydration status and recommend course of action  - Evaluate amount of meals eaten  - Assist patient with eating if necessary   - Allow adequate time for meals  - Recommend/ encourage appropriate diets, oral nutritional supplements, and vitamin/mineral supplements  - Order, calculate, and assess calorie counts as needed  - Recommend, monitor, and adjust tube feedings and TPN/PPN based on assessed needs  - Assess need for intravenous fluids  - Provide specific nutrition/hydration education as appropriate  - Include patient/family/caregiver in decisions related to nutrition  Outcome: Progressing

## 2021-08-27 NOTE — UTILIZATION REVIEW
Inpatient Admission Authorization Request   NOTIFICATION OF INPATIENT ADMISSION/INPATIENT AUTHORIZATION REQUEST   SERVICING FACILITY:   [facility]  Address: [address] [place] [address], [address] [place] [address]  Tax ID: [id number]  NPI: [id number]  Place of Service: Inpatient [address] Code: 24     ATTENDING PROVIDER:  Attending Name and NPI#: Mohan Padilla Md [[id number]]  Address: [address] [place] [address], [address] [place] [id number]  Phone: [phone]     UTILIZATION REVIEW CONTACT:  Larry Jamison, Utilization   Network Utilization Review Department  Phone: [phone]  Fax: [phone]  Email: [email or web address]     PHYSICIAN ADVISORY SERVICES:  FOR LFDY-[id number]-LBPT REVIEW - MEDICAL NECESSITY DENIAL  Phone: [phone]  Fax: [phone]  Email: [email or web address]     TYPE OF REQUEST:  Inpatient Status     ADMISSION INFORMATION:  ADMISSION DATE/TIME: 8/26/21  2:58 PM  PATIENT DIAGNOSIS CODE/DESCRIPTION:  Vomiting [R11 10]  Tachycardia [R00 0]  Nausea & vomiting [R11 2]  Fever [R50 9]  Trichomoniasis [A59 9]  DISCHARGE DATE/TIME: No discharge date for patient encounter  DISCHARGE DISPOSITION (IF DISCHARGED): Final discharge disposition not confirmed     IMPORTANT INFORMATION:  Please contact the Larry Jamison directly with any questions or concerns regarding this request  Department voicemails are confidential     Send requests for admission clinical reviews, concurrent reviews, approvals, and administrative denials due to lack of clinical to fax [phone]

## 2021-08-27 NOTE — PROGRESS NOTES
Vancomycin Assessment    Ellen Buchanan is a 31 y o  female who is currently receiving vancomycin 1250mg IV q8h for other hospital acquired infection   Relevant clinical data and objective history reviewed:  Creatinine   Date Value Ref Range Status   08/27/2021 0 52 (L) 0 60 - 1 30 mg/dL Final     Comment:     Standardized to IDMS reference method   08/26/2021 0 60 0 60 - 1 30 mg/dL Final     Comment:     Standardized to IDMS reference method   08/25/2021 0 72 0 60 - 1 30 mg/dL Final     Comment:     Standardized to IDMS reference method     /80   Pulse (!) 133   Temp 99 5 °F (37 5 °C)   Resp 20   Ht 5' 1" (1 549 m) Comment: Per Chart review in prior encounters  Wt 53 5 kg (118 lb)   LMP 08/04/2021   SpO2 97%   BMI 22 30 kg/m²   I/O last 3 completed shifts: In: 1090 5 [P O :340; I V :750 5]  Out: -   Lab Results   Component Value Date/Time    BUN 9 08/27/2021 02:00 AM    WBC 11 79 (H) 08/27/2021 02:35 AM    HGB 9 7 (L) 08/27/2021 02:35 AM    HCT 29 2 (L) 08/27/2021 02:35 AM    MCV 87 08/27/2021 02:35 AM     08/27/2021 02:35 AM     Temp Readings from Last 3 Encounters:   08/27/21 99 5 °F (37 5 °C)     Vancomycin Days of Therapy: 2    Assessment/Plan  The patient is currently on vancomycin utilizing scheduled dosing  The patient is receiving 1250mg IV q8h  Today's vanco trough was missed prior to giving dose  Based on goal of 15-20, will continue current dose and reorder trough  Pharmacy will continue to follow closely for s/sx of nephrotoxicity, infusion reactions, and appropriateness of therapy  BMP and CBC will be ordered per protocol  Plan for trough as patient approaches steady state, prior to the 6th dose at approximately 10:00 am 8/28/21 Pharmacy will continue to follow the patients culture results and clinical progress daily      Tab Mccloud

## 2021-08-27 NOTE — CASE MANAGEMENT
Patient is not a 30-day readmission, nor a bundle patient   met with patient at bedside to obtain psychosocial assessment and discuss discharge needs  Patient is a 58-year-old female admitted to [facility] with a diagnosis of COVID+  Patient was admitted from [facility] re[facility]  She states her parents just moved and doesn't know their new home address  Patient reports being independent with ADL's and mobility, no DME's, and no current home care services  Patient denies a history of mental health, but admits to a history of substance use  She has been to [facility] inpatient rehab, [facility] inpatient rehab, and [facility] inpatient rehab for treatment  She is currently on suboxone prescribed by a physician at the [facility] facility  She does not have a PCP and no preferred pharmacy  Patient states she did not get the COVID vaccines  Patient was educated on the CM role, transportation, and the discharge process, including options such as home care versus rehab  WDR can likely provide transportation back to the rehab at time of discharge, as long as they plan to accept her back  CM will continue to follow patient to assist with discharge planning and provide supportive counseling as needed  CM called and spoke to HOST (P: [phone]) who will assist with coordination of DC back to [facility]  CM reviewed d/c planning process including the following: identifying help at home, patient preference for d/c planning needs, Discharge Lounge, [facility] Meds to Bed program, availability of treatment team to discuss questions or concerns patient and/or family may have regarding understanding medications and recognizing signs and symptoms once discharged  CM also encouraged patient to follow up with all recommended appointments after discharge  Patient advised of importance for patient and family to participate in managing patients medical well being

## 2021-08-27 NOTE — UTILIZATION REVIEW
Initial Clinical Review    Admission: Date/Time/Statement:   Admission Orders: Observation 8/25 @ 2247 and change to Inpatient on 8/26 @ 1458  Pt requiring continued stay for + COVID-19/ Fever/ Iv antibiotics and electrolyte replacement     Ordered        08/26/21 1458  Inpatient Admission  Once         08/25/21 2247  Place in Observation  Once                   Orders Placed This Encounter   Procedures    Inpatient Admission     Standing Status:   Standing     Number of Occurrences:   1     Order Specific Question:   Level of Care     Answer:   Med Surg [16]     Order Specific Question:   Estimated length of stay     Answer:   More than 2 Midnights     Order Specific Question:   Certification     Answer:   I certify that inpatient services are medically necessary for this patient for a duration of greater than two midnights  See H&P and MD Progress Notes for additional information about the patient's course of treatment  ED Arrival Information     Expected Arrival Acuity    - 8/25/2021 17:20 Urgent         Means of arrival Escorted by Service Admission type    Ambulance [facility]) General Medicine Urgent         Arrival complaint    Vomiting        Chief Complaint   Patient presents with    Vomiting     pt from [facility] detox center, reported nausea and vomiting since 1100, other resident at center diagnosed with covid 5 days ago, pt had a negative test yesterday, also just started gabapentin 300mg TID and thinks this may be related to the medication     Initial Presentation:   25y Female to ED presents from [facility] center with c/o nausea and vomiting  Yesterday am had nausea and 3 episodes of nonbloody emesisLast wk she had nausea and vomiting for 2-3 days thinking it was a "stomach bug " Red colored emesis on 2 occasions but also had red color Gatorade  Test positive for COVID-19 5 days ago, but tested negative yesterday  Pt also started gabapentin 300 mg tid 2 days ago   PMH for Hepatitis C, Tobacco dependence and Opioid dependence  In ED found to be tachycardic () with temp of 100 5  COVID test positive  CTA chest was negative for acute PE  There was evidence of multifocal patchy and ground-glass airspace opacity in the right upper and lower lobes, indicative of COVID-19  UA was significant for moderate WBC and innumeral bacteria, positive for Trichomonas and given Iv antibiotics x1  Admit Observation level of care for COVID-19, Anemia, Hypokalemia, Hepatitis C and Trichomoniasis  Currently in rehab at [facility]  Significant for shortness of breath, upper respiratory tract infection symptoms, chest pain, abdominal pain, diarrhea, leg swelling  Patient is tachycardic but on room air with SpO2 97%  EKG demonstrates sinus tachycardia  CTA chest findings are indicative of acute COVID infection, and negative for PE  procalcitonin is elevated 3 06  ? superficial bacterial infection in the setting of COVID pneumonia  Therapeutic Lovenox  Continue IV/ Po antibiotics  Trend Procalcitonin and lactate  Current Hgb 9 8, baseline 11 5-12  Iron panel pending  Peripheral smear pending  K 3 2, replace with K-dur 40 meq x2  Trend K levels  Mg level pending  Continue Suboxone 8 ng  Iv Zofran prn      8/26 Changed to Inpatient status  Continues with fever with tmax of 104  1  Continues on Iv antibiotics and IVFs  K improved to 3 7       ED Triage Vitals   Temperature Pulse Respirations Blood Pressure SpO2   08/25/21 1725 08/25/21 1725 08/25/21 1725 08/25/21 1725 08/25/21 1725   100 5 °F (38 1 °C) (!) 136 22 118/59 96 %      Temp Source Heart Rate Source Patient Position - Orthostatic VS BP Location FiO2 (%)   08/25/21 1725 08/25/21 1725 08/25/21 1725 08/25/21 1725 --   Tympanic Monitor Lying Right arm       Pain Score       08/25/21 1846       No Pain          Wt Readings from Last 1 Encounters:   08/26/21 53 5 kg (118 lb)     Additional Vital Signs:   08/27/21 08:33:37  99 5 °F (37 5 °C)  --  --  124/80  95  --  --  --   08/27/21 08:29:19  99 5 °F (37 5 °C)  --  --  124/80  95  --  --  --   08/27/21 0127  101 8 °F (38 8 °C)  Abnormal   --  --  --  --  --  --  --   08/26/21 18:47:46  --  --  --  130/87  101  --  --  --   08/26/21 1841  104 1 °F (40 1 °C)  Abnormal   133  Abnormal   20  --  --  --  --       08/26/21 06:37:30  103 °F (39 4 °C)  Abnormal   130  Abnormal   19  129/73  92  97 %  --  Lying   08/26/21 0030  --  123  Abnormal   16  100/63  --  96 %  None (Room air)  Lying   08/26/21 0015  --  121  Abnormal   16  98/57  73  98 %  None (Room air)  Lying   08/25/21 2100  --  122  Abnormal   18  104/61  --  97 %  None (Room air)  Lying   08/25/21 1930  --  124  Abnormal   18  103/60  74  97 %  None (Room air)         Pertinent Labs/Diagnostic Test Results:   8/25  CTA ed chest pe study -  1   Multifocal patchy and groundglass airspace opacity, particularly in the right upper and lower lobes In the setting of clinically suspected/proven COVID-19, the above lung parenchymal findings on CT indicate intermediate confidence level for COVID-19   2   No acute pulmonary embolism  3   Partially imaged liver and spleen appear enlarged  If there is clinical concern, this can be further evaluated with ultrasound      EKG - Sinus tachycardia     Results from last 7 days   Lab Units 08/25/21  1824   SARS-COV-2  Positive*     Results from last 7 days   Lab Units 08/27/21  0235 08/26/21  0457 08/25/21  1824   WBC Thousand/uL 11 79* 12 60* 10 41*   HEMOGLOBIN g/dL 9 7* 9 9* 9 8*   HEMATOCRIT % 29 2* 30 4* 30 1*   PLATELETS Thousands/uL 251 272 269   NEUTROS ABS Thousands/µL 10 72*  --   --    BANDS PCT %  --   --  55*         Results from last 7 days   Lab Units 08/27/21  0200 08/26/21  0457 08/25/21  1824   SODIUM mmol/L 138 136 137   POTASSIUM mmol/L 3 2* 3 7 3 2*   CHLORIDE mmol/L 108 109* 109*   CO2 mmol/L 25 23 21   ANION GAP mmol/L 5 4 7   BUN mg/dL 9 10 13   CREATININE mg/dL 0 52* 0 60 0 72 EGFR ml/min/1 73sq m 132 126 116   CALCIUM mg/dL 7 2* 7 6* 7 6*   MAGNESIUM mg/dL  --  2 0  --      Results from last 7 days   Lab Units 08/26/21  0457 08/25/21  1824   AST U/L 47* 61*   ALT U/L 34 37   ALK PHOS U/L 300* 344*   TOTAL PROTEIN g/dL 5 9* 5 9*   ALBUMIN g/dL 2 3* 2 3*   TOTAL BILIRUBIN mg/dL 0 33 0 64         Results from last 7 days   Lab Units 08/27/21  0200 08/26/21  0457 08/25/21  1824   GLUCOSE RANDOM mg/dL 96 106 92           Results from last 7 days   Lab Units 08/26/21  0457 08/25/21  1824   CK TOTAL U/L 135 178   CK MB INDEX % <1 0 <1 0   CK MB ng/mL <1 0 1 1     Results from last 7 days   Lab Units 08/27/21  0207 08/26/21  0009   TROPONIN I ng/mL <0 02 <0 02     Results from last 7 days   Lab Units 08/25/21  1824   D-DIMER QUANTITATIVE ug/ml FEU 2 79*     Results from last 7 days   Lab Units 08/25/21  1824   PROTIME seconds 15 6*   INR  1 24*   PTT seconds 37     Results from last 7 days   Lab Units 08/25/21  1824   TSH 3RD GENERATON uIU/mL 0 335*     Results from last 7 days   Lab Units 08/27/21  0218 08/26/21  1216 08/25/21  1824   PROCALCITONIN ng/ml 4 71* 6 13* 3 06*     Results from last 7 days   Lab Units 08/25/21  1824   LACTIC ACID mmol/L 0 9             Results from last 7 days   Lab Units 08/26/21  0457 08/25/21  1824   NT-PRO BNP pg/mL 542* 256*     Results from last 7 days   Lab Units 08/26/21  0457   FERRITIN ng/mL 105     Results from last 7 days   Lab Units 08/26/21  0845   HEP C AB  High Reactive*     Results from last 7 days   Lab Units 08/25/21  1824   LIPASE u/L 36*     Results from last 7 days   Lab Units 08/26/21  0457 08/25/21  1824   CRP mg/L 209 0* 128 0*         Results from last 7 days   Lab Units 08/25/21 1919 08/25/21 1917   CLARITY UA  Clear Turbid   COLOR UA  Yellow Yellow   SPEC GRAV UA  1 015 1 013   PH UA  5 5 5 5   GLUCOSE UA mg/dl Negative Negative   KETONES UA mg/dl Negative Negative   BLOOD UA  Small* Small*   PROTEIN UA mg/dl Negative Negative NITRITE UA  Negative Negative   BILIRUBIN UA  Negative Negative   UROBILINOGEN UA E U /dl 0 2 0 2   LEUKOCYTES UA  Trace* Large*   WBC UA /hpf  --  30-50*   RBC UA /hpf  --  2-4   BACTERIA UA /hpf  --  Innumerable*   EPITHELIAL CELLS WET PREP /hpf  --  Moderate*       Results from last 7 days   Lab Units 08/25/21  1917 08/25/21  1841 08/25/21  1833   BLOOD CULTURE   --  No Growth at 24 hrs  No Growth at 24 hrs     URINE CULTURE  Culture too young- will reincubate  --   --        ED Treatment:   Medication Administration from 08/25/2021 1720 to 08/26/2021 0251       Date/Time Order Dose Route Action     08/25/2021 1846 multi-electrolyte (ISOLYTE-S PH 7 4) bolus 1,000 mL 1,000 mL Intravenous New Bag     08/25/2021 1846 acetaminophen (TYLENOL) tablet 975 mg 975 mg Oral Given     08/25/2021 2005 iohexol (OMNIPAQUE) 350 MG/ML injection (MULTI-DOSE) 85 mL 85 mL Intravenous Given     08/25/2021 2132 buprenorphine-naloxone (Suboxone) film 8 mg 8 mg Sublingual Given     08/26/2021 0009 metroNIDAZOLE (FLAGYL) tablet 2,000 mg 2,000 mg Oral Given        Past Medical History:   Diagnosis Date    Opiate abuse, continuous (HCC)      Present on Admission:   Vomiting   COVID-19   Opioid dependence (HCC)   Tobacco dependence   Trichimoniasis   Hypokalemia   Anemia   Hepatitis C      Admitting Diagnosis: Vomiting [R11 10]  Tachycardia [R00 0]  Nausea & vomiting [R11 2]  Fever [R50 9]  Trichomoniasis [A59 9]  Age/Sex: 31 y o  female     Admission Orders:  Scheduled Medications:  buprenorphine-naloxone, 8 mg, Sublingual, BID  buPROPion, 300 mg, Oral, QAM  cefepime, 2,000 mg, Intravenous, Q8H  ferrous sulfate, 325 mg, Oral, Daily With Breakfast  folic acid, 1 mg, Oral, Daily  potassium chloride, 40 mEq, Oral, BID  thiamine, 100 mg, Oral, Daily  vancomycin, 25 mg/kg, Intravenous, Q8H      Continuous IV Infusions:  multi-electrolyte, 125 mL/hr, Intravenous, Continuous      PRN Meds:  acetaminophen, 650 mg, Oral, Q6H PRN 8/26 x1, 8/27 x2  ondansetron, 4 mg, Intravenous, Q8H PRN 8/26 x2, 8/27 x2  sodium chloride (PF), 3 mL, Intravenous, Q1H PRN      Bld culture x2    Network Utilization Review Department  ATTENTION: Please call with any questions or concerns to [phone] and carefully listen to the prompts so that you are directed to the right person  All voicemails are confidential   Marval Shaper all requests for admission clinical reviews, approved or denied determinations and any other requests to dedicated fax number below belonging to the campus where the patient is receiving treatment   List of dedicated fax numbers for the Facilities:  [address] DENIALS (Administrative/Medical Necessity) [phone]   [address] (Maternity/NICU/Pediatrics) [phone] [address] [place] [address] [address] [place] [address] Tevin F[address] [place] [address] [phone]

## 2021-08-27 NOTE — PROGRESS NOTES
[facility] dropped off pt's belongings and meds  Meds were sent to pharmacy and belongings are safe in storage room

## 2021-08-27 NOTE — PROGRESS NOTES
INTERNAL MEDICINE RESIDENCY PROGRESS NOTE     Name: Radha Benjamin   Age & Sex: 31 y o  female   MRN: [id number]  Unit/Bed#: -01   Encounter: [id number]  Team: SOD Team A    PATIENT INFORMATION     Name: Radha Benjamin   Age & Sex: 31 y o  female   MRN: [id number] [facility] Stay Days: 1    ASSESSMENT/PLAN     Principal Problem:    COVID-19  Active Problems:    Vomiting    Opioid dependence ([address] )    Tobacco dependence    Trichimoniasis    Hypokalemia    Anemia    Hepatitis C      Hepatitis C  Assessment & Plan  Patient reports testing positive for Hep C in 2015  She has not been treated for it  Last known viral load [id number] (7/21/20)  She states she was waiting to get clean before seeking treatment  Hepatitis Panel with viral load     Plan:  · Hepatitis-C PCR pending  · Outpatient GI/hepatologist follow-up     Anemia  Assessment & Plan  Patient vomited red colored emesis on 2 occasions  However she reportedly had drink red color Gatorade and It was unclear whether this was true hematemesis  Current hemoglobin of 9 8  Per chart review her baseline appears to be from 11 5-12  She is not actively bleeding right now  Lab Results   Component Value Date/Time    HGB 9 7 (L) 08/27/2021 02:35 AM    RDW 18 1 (H) 08/27/2021 02:35 AM    HCT 29 2 (L) 08/27/2021 02:35 AM    MCV 87 08/27/2021 02:35 AM    MCHC 33 2 08/27/2021 02:35 AM     Plan:  · Peripheral smear pending  · Ferrous sulfate 325 mg daily  · Patient is not actively bleeding right now and consider outpatient follow-up for further eval      Hypokalemia  Assessment & Plan  Lab Results   Component Value Date/Time    K 3 2 (L) 08/25/2021 06:24 PM     Hypokalemia in the setting of emesis during admission  2/2 volume depletion     Plan:  · Potassium repletion with K-dur 40 mEq x 2 doses  · Continue monitoring BMP  · Replete potassium as needed        Trichimoniasis  Assessment & Plan  UA was significant for moderate WBC and innumeral bacteria, positive for Trichomonas  Patient denied symptoms of vaginitis  She was given 2 g of Flagyl in the ED  Plan:  · Outpatient follow-up after 2 weeks for for repeat testing with NAAT to confirm eradication          Tobacco dependence  Assessment & Plan  Patient smokes half pack per day for the past 5 years  Patient offered nicotine replacement therapy  Patient refused  Patient was counseled on tobacco cessation    Opioid dependence [facility])  Assessment & Plan  Patient has a history of opioid abuse  Currently in rehab at [facility] and the [facility] detox center  No signs of withdrawal     Plan:  · Continue Suboxone 8 mg     Vomiting  Assessment & Plan  2 episodes of vomiting today prior to transfer to [facility] for evaluation 2/2 COVID infection vs gabapentin side effect     Plan:  · Continue Zofran IV PRN      * COVID-19  Assessment & Plan  Lab Results   Component Value Date/Time    TROPONINI <0 02 08/27/2021 02:07 AM    CKTOTAL 135 08/26/2021 04:57 AM    NTBNP 542 (H) 08/26/2021 04:57 AM    PROCALCITONI 4 71 (H) 08/27/2021 02:18 AM    LACTICACID 0 9 08/25/2021 06:24 PM    WBC 11 79 (H) 08/27/2021 02:35 AM     40-year-old female with symptom onset, including nausea and vomiting, 1 day prior tested positive for COVID  For was significant for shortness of breath, upper respiratory tract infection symptoms, chest pain, abdominal pain, diarrhea, leg swelling  Patient is tachycardic but on room air with SpO2 97%  EKG demonstrates sinus tachycardia  CTA chest findings are indicative of acute COVID infection, and negative for PE  However procalcitonin was elevated 3 06 on admission, increased to 6 13 after  Superficial bacterial infection in the setting of COVID pneumonia suspected      Plan:  · Mild COVID pathway   · Therapeutic enoxaparin for anticoagulation - discontinued  · Patient apparently started on ceftriaxone 1000 mg and doxycycline 100 mg p o  on admission admission - discontinued  · IV vancomycin and cefepime started on   · MRSA culture pending  · Blood cultures pending  · Continue to trend procalcitonin and lactate      Disposition:  Mild COVID pathway  Continue IV antibiotics and trending procalcitonin  SUBJECTIVE     Patient seen and examined  No acute events overnight  Patient doing well overall  She denies chest pain, shortness of breath, fever or chills  Patient reports abdominal cramps due to her menstrual cycle  OBJECTIVE     Vitals:    21 1847 21 0127 21 0829 21 0833   BP: 130/87  124/80 124/80   BP Location:       Pulse:       Resp:       Temp:  (!) 101 8 °F (38 8 °C) 99 5 °F (37 5 °C) 99 5 °F (37 5 °C)   TempSrc:  Oral     SpO2:       Weight:       Height:          Temperature:   Temp (24hrs), Av 2 °F (38 4 °C), Min:99 5 °F (37 5 °C), Max:104 1 °F (40 1 °C)    Temperature: 99 5 °F (37 5 °C)  Intake & Output:  I/O        07 -  0700  07 -  0700  07 -  0700    P  O   340     I V  (mL/kg) 1000 (18 7) 750 5 (14)     Total Intake(mL/kg) 1000 (18 7) 1090 5 (20 4)     Net +1000 +1090 5                Weights:   IBW (Ideal Body Weight): 47 8 kg    Body mass index is 22 3 kg/m²  Weight (last 2 days)     Date/Time   Weight    21 0300   53 5 (118)    21 1725   53 5 (118)            Physical Exam  Vitals and nursing note reviewed  Constitutional:       General: She is not in acute distress  Appearance: She is well-developed  HENT:      Head: Normocephalic and atraumatic  Eyes:      Conjunctiva/sclera: Conjunctivae normal    Cardiovascular:      Rate and Rhythm: Normal rate and regular rhythm  Heart sounds: No murmur heard  Pulmonary:      Effort: Pulmonary effort is normal  No respiratory distress  Breath sounds: Normal breath sounds  Abdominal:      Palpations: Abdomen is soft  Tenderness: There is abdominal tenderness  There is no guarding or rebound  Musculoskeletal:      Cervical back: Neck supple     Skin: General: Skin is warm and dry  Neurological:      General: No focal deficit present  Mental Status: She is alert and oriented to person, place, and time  Psychiatric:         Mood and Affect: Mood normal          Behavior: Behavior normal        LABORATORY DATA     Labs: I have personally reviewed pertinent reports  Results from last 7 days   Lab Units 08/27/21  0235 08/26/21  0457 08/25/21  1824   WBC Thousand/uL 11 79* 12 60* 10 41*   HEMOGLOBIN g/dL 9 7* 9 9* 9 8*   HEMATOCRIT % 29 2* 30 4* 30 1*   PLATELETS Thousands/uL 251 272 269   NEUTROS PCT % 90*  --   --    MONOS PCT % 3*  --   --    MONO PCT %  --   --  2*      Results from last 7 days   Lab Units 08/27/21  0200 08/26/21  0457 08/25/21  1824   POTASSIUM mmol/L 3 2* 3 7 3 2*   CHLORIDE mmol/L 108 109* 109*   CO2 mmol/L 25 23 21   BUN mg/dL 9 10 13   CREATININE mg/dL 0 52* 0 60 0 72   CALCIUM mg/dL 7 2* 7 6* 7 6*   ALK PHOS U/L  --  300* 344*   ALT U/L  --  34 37   AST U/L  --  47* 61*     Results from last 7 days   Lab Units 08/26/21  0457   MAGNESIUM mg/dL 2 0          Results from last 7 days   Lab Units 08/25/21  1824   INR  1 24*   PTT seconds 37     Results from last 7 days   Lab Units 08/25/21  1824   LACTIC ACID mmol/L 0 9     Results from last 7 days   Lab Units 08/27/21  0207 08/26/21  0009   TROPONIN I ng/mL <0 02 <0 02       IMAGING & DIAGNOSTIC TESTING     Radiology Results: I have personally reviewed pertinent reports  CTA ED chest PE Study    Result Date: 8/25/2021  Impression: 1  Multifocal patchy and groundglass airspace opacity, particularly in the right upper and lower lobes In the setting of clinically suspected/proven COVID-19, the above lung parenchymal findings on CT indicate intermediate confidence level for COVID-19  2   No acute pulmonary embolism  3   Partially imaged liver and spleen appear enlarged  If there is clinical concern, this can be further evaluated with ultrasound   The study was marked in [facility] for immediate notification  Workstation performed: [id number]     Other Diagnostic Testing: I have personally reviewed pertinent reports  ACTIVE MEDICATIONS     Current Facility-Administered Medications   Medication Dose Route Frequency    acetaminophen (TYLENOL) tablet 650 mg  650 mg Oral Q6H PRN    buprenorphine-naloxone (Suboxone) film 8 mg  8 mg Sublingual BID    buPROPion (WELLBUTRIN XL) 24 hr tablet 300 mg  300 mg Oral QAM    cefepime (MAXIPIME) 2,000 mg in dextrose 5 % 50 mL IVPB  2,000 mg Intravenous Q8H    ferrous sulfate tablet 325 mg  325 mg Oral Daily With Breakfast    folic acid (FOLVITE) tablet 1 mg  1 mg Oral Daily    multi-electrolyte (PLASMALYTE-A/ISOLYTE-S PH 7 4) IV solution  125 mL/hr Intravenous Continuous    ondansetron (ZOFRAN) injection 4 mg  4 mg Intravenous Q8H PRN    potassium chloride (K-DUR,KLOR-CON) CR tablet 40 mEq  40 mEq Oral BID    sodium chloride (PF) 0 9 % injection 3 mL  3 mL Intravenous Q1H PRN    thiamine tablet 100 mg  100 mg Oral Daily    vancomycin (VANCOCIN) 1,250 mg in sodium chloride 0 9 % 250 mL IVPB  25 mg/kg Intravenous Q8H     Portions of the record may have been created with voice recognition software  Occasional wrong word or "sound a like" substitutions may have occurred due to the inherent limitations of voice recognition software    Read the chart carefully and recognize, using context, where substitutions have occurred   ==  Twan Cobb MD  [address]  Internal Medicine Residency PGY-1

## 2021-08-28 LAB
ANION GAP SERPL CALCULATED.3IONS-SCNC: 5 MMOL/L (ref 4–13)
BACTERIA UR CULT: ABNORMAL
BACTERIA UR CULT: ABNORMAL
BUN SERPL-MCNC: 7 MG/DL (ref 5–25)
CALCIUM SERPL-MCNC: 7.3 MG/DL (ref 8.3–10.1)
CHLORIDE SERPL-SCNC: 110 MMOL/L (ref 100–108)
CO2 SERPL-SCNC: 20 MMOL/L (ref 21–32)
CREAT SERPL-MCNC: 0.29 MG/DL (ref 0.6–1.3)
ERYTHROCYTE [DISTWIDTH] IN BLOOD BY AUTOMATED COUNT: 18.5 % (ref 11.6–15.1)
GFR SERPL CREATININE-BSD FRML MDRD: 160 ML/MIN/1.73SQ M
GLUCOSE SERPL-MCNC: 69 MG/DL (ref 65–140)
HCT VFR BLD AUTO: 34.1 % (ref 34.8–46.1)
HCV RNA SERPL NAA+PROBE-ACNC: 390 IU/ML
HCV RNA SERPL NAA+PROBE-LOG IU: 2.59 LOG10 IU/ML
HGB BLD-MCNC: 10.7 G/DL (ref 11.5–15.4)
MCH RBC QN AUTO: 28.2 PG (ref 26.8–34.3)
MCHC RBC AUTO-ENTMCNC: 31.4 G/DL (ref 31.4–37.4)
MCV RBC AUTO: 90 FL (ref 82–98)
MRSA NOSE QL CULT: ABNORMAL
MRSA NOSE QL CULT: ABNORMAL
NRBC BLD AUTO-RTO: 0 /100 WBCS
PLATELET # BLD AUTO: 254 THOUSANDS/UL (ref 149–390)
PMV BLD AUTO: 10.7 FL (ref 8.9–12.7)
POTASSIUM SERPL-SCNC: 3.6 MMOL/L (ref 3.5–5.3)
PROCALCITONIN SERPL-MCNC: 2.6 NG/ML
RBC # BLD AUTO: 3.79 MILLION/UL (ref 3.81–5.12)
SODIUM SERPL-SCNC: 135 MMOL/L (ref 136–145)
TEST INFORMATION: NORMAL
VANCOMYCIN TROUGH SERPL-MCNC: 10.4 UG/ML (ref 10–20)
WBC # BLD AUTO: 13.44 THOUSAND/UL (ref 4.31–10.16)

## 2021-08-28 PROCEDURE — 80202 ASSAY OF VANCOMYCIN: CPT | Performed by: INTERNAL MEDICINE

## 2021-08-28 PROCEDURE — 80048 BASIC METABOLIC PNL TOTAL CA: CPT

## 2021-08-28 PROCEDURE — 84145 PROCALCITONIN (PCT): CPT

## 2021-08-28 PROCEDURE — 85027 COMPLETE CBC AUTOMATED: CPT

## 2021-08-28 PROCEDURE — 99232 SBSQ HOSP IP/OBS MODERATE 35: CPT | Performed by: INTERNAL MEDICINE

## 2021-08-28 RX ORDER — POTASSIUM CHLORIDE 20 MEQ/1
20 TABLET, EXTENDED RELEASE ORAL DAILY
Status: DISCONTINUED | OUTPATIENT
Start: 2021-08-28 | End: 2021-08-30

## 2021-08-28 RX ADMIN — SODIUM CHLORIDE, SODIUM GLUCONATE, SODIUM ACETATE, POTASSIUM CHLORIDE, MAGNESIUM CHLORIDE, SODIUM PHOSPHATE, DIBASIC, AND POTASSIUM PHOSPHATE 125 ML/HR: .53; .5; .37; .037; .03; .012; .00082 INJECTION, SOLUTION INTRAVENOUS at 22:04

## 2021-08-28 RX ADMIN — VANCOMYCIN HYDROCHLORIDE 1250 MG: 10 INJECTION, POWDER, LYOPHILIZED, FOR SOLUTION INTRAVENOUS at 01:30

## 2021-08-28 RX ADMIN — FOLIC ACID 1 MG: 1 TABLET ORAL at 08:52

## 2021-08-28 RX ADMIN — BUPRENORPHINE AND NALOXONE 8 MG: 8; 2 FILM BUCCAL; SUBLINGUAL at 17:19

## 2021-08-28 RX ADMIN — VANCOMYCIN HYDROCHLORIDE 1250 MG: 10 INJECTION, POWDER, LYOPHILIZED, FOR SOLUTION INTRAVENOUS at 11:42

## 2021-08-28 RX ADMIN — BUPRENORPHINE AND NALOXONE 8 MG: 8; 2 FILM BUCCAL; SUBLINGUAL at 08:53

## 2021-08-28 RX ADMIN — FERROUS SULFATE TAB 325 MG (65 MG ELEMENTAL FE) 325 MG: 325 (65 FE) TAB at 06:05

## 2021-08-28 RX ADMIN — POTASSIUM CHLORIDE 20 MEQ: 1500 TABLET, EXTENDED RELEASE ORAL at 11:59

## 2021-08-28 RX ADMIN — ACETAMINOPHEN 650 MG: 325 TABLET, FILM COATED ORAL at 17:45

## 2021-08-28 RX ADMIN — CEFEPIME HYDROCHLORIDE 2000 MG: 2 INJECTION, POWDER, FOR SOLUTION INTRAVENOUS at 15:48

## 2021-08-28 RX ADMIN — VANCOMYCIN HYDROCHLORIDE 1500 MG: 1 INJECTION, POWDER, LYOPHILIZED, FOR SOLUTION INTRAVENOUS at 17:19

## 2021-08-28 RX ADMIN — CEFEPIME HYDROCHLORIDE 2000 MG: 2 INJECTION, POWDER, FOR SOLUTION INTRAVENOUS at 08:47

## 2021-08-28 RX ADMIN — THIAMINE HCL TAB 100 MG 100 MG: 100 TAB at 08:52

## 2021-08-28 RX ADMIN — BUPROPION HYDROCHLORIDE 300 MG: 150 TABLET, FILM COATED, EXTENDED RELEASE ORAL at 08:53

## 2021-08-28 NOTE — ED ATTENDING ATTESTATION
8/25/2021  IYony MD, saw and evaluated the patient  I have discussed the patient with the resident/non-physician practitioner and agree with the resident's/non-physician practitioner's findings, Plan of Care, and MDM as documented in the resident's/non-physician practitioner's note, except where noted  All available labs and Radiology studies were reviewed  I was present for key portions of any procedure(s) performed by the resident/non-physician practitioner and I was immediately available to provide assistance  At this point I agree with the current assessment done in the Emergency Department  I have conducted an independent evaluation of this patient a history and physical is as follows:    ED Course    50-year-old female recent hospitalization for sepsis and recent diagnosis of COVID-19 from rehab facility presents with nausea vomiting    Patient admits to vomiting up her medications earlier including her Suboxone    Vitals reviewed patient noted to be tachycardic no murmurs lungs clear to auscultation bilaterally abdomen soft nontender nondistended normal bowel sounds neuro exam nonfocal    Impression vomiting tachycardia will give IV fluids check screening labs antiemetics reassess    Patient admitted for persistent tachycardia        Critical Care Time  Procedures

## 2021-08-28 NOTE — PROGRESS NOTES
Vancomycin IV Pharmacy-to-Dose Consultation    Leslye Menjivar is a 31 y o  female who is currently receiving Vancomycin IV with management by the Pharmacy Consult service  Assessment/Plan:  The patient was reviewed  Renal function is stable and no signs or symptoms of nephrotoxicity and/or infusion reactions were documented in the chart  Trough level drawn this morning was subtherapeutic at 10 4, though the trough was drawn 10 hours after the last dose instead of 8 hours after  Extrapolated trough is still subtherapeutic  MRSA swab still pending  Based on todays assessment, increase vancomycin dose to 1500 mg q8h, with a plan for trough to be drawn before the 4th dose of the new regimen at 1730 on 8/29  We will continue to follow the patients culture results and clinical progress daily      Henok Parisi PharmD  Emergency Medicine Clinical Pharmacist    or Bindu

## 2021-08-28 NOTE — PLAN OF CARE
Problem: Potential for Falls  Goal: Patient will remain free of falls  Description: INTERVENTIONS:  - Educate patient/family on patient safety including physical limitations  - Instruct patient to call for assistance with activity   - Consult OT/PT to assist with strengthening/mobility   - Keep Call bell within reach  - Keep bed low and locked with side rails adjusted as appropriate  - Keep care items and personal belongings within reach  - Initiate and maintain comfort rounds  - Make Fall Risk Sign visible to staff  - Offer Toileting every 2 Hours, in advance of need  - Initiate/Maintain bed alarm  - Obtain necessary fall risk management equipment: bed alarm  - Apply yellow socks and bracelet for high fall risk patients  - Consider moving patient to room near nurses station  Outcome: Progressing     Problem: PAIN - ADULT  Goal: Verbalizes/displays adequate comfort level or baseline comfort level  Description: Interventions:  - Encourage patient to monitor pain and request assistance  - Assess pain using appropriate pain scale  - Administer analgesics based on type and severity of pain and evaluate response  - Implement non-pharmacological measures as appropriate and evaluate response  - Consider cultural and social influences on pain and pain management  - Notify physician/advanced practitioner if interventions unsuccessful or patient reports new pain  Outcome: Progressing     Problem: INFECTION - ADULT  Goal: Absence or prevention of progression during hospitalization  Description: INTERVENTIONS:  - Assess and monitor for signs and symptoms of infection  - Monitor lab/diagnostic results  - Monitor all insertion sites, i e  indwelling lines, tubes, and drains  - Monitor endotracheal if appropriate and nasal secretions for changes in amount and color  - [place] appropriate cooling/warming therapies per order  - Administer medications as ordered  - Instruct and encourage patient and family to use good hand hygiene technique  - Identify and instruct in appropriate isolation precautions for identified infection/condition  Outcome: Progressing  Goal: Absence of fever/infection during neutropenic period  Description: INTERVENTIONS:  - Monitor WBC    Outcome: Progressing     Problem: SAFETY ADULT  Goal: Patient will remain free of falls  Description: INTERVENTIONS:  - Educate patient/family on patient safety including physical limitations  - Instruct patient to call for assistance with activity   - Consult OT/PT to assist with strengthening/mobility   - Keep Call bell within reach  - Keep bed low and locked with side rails adjusted as appropriate  - Keep care items and personal belongings within reach  - Initiate and maintain comfort rounds  - Make Fall Risk Sign visible to staff  - Offer Toileting every 2  Hours, in advance of need  - Initiate/Maintain bed alarm  - Obtain necessary fall risk management equipment: bed alarm  - Apply yellow socks and bracelet for high fall risk patients  - Consider moving patient to room near nurses station  Outcome: Progressing  Goal: Maintain or return to baseline ADL function  Description: INTERVENTIONS:  -  Assess patient's ability to carry out ADLs; assess patient's baseline for ADL function and identify physical deficits which impact ability to perform ADLs (bathing, care of mouth/teeth, toileting, grooming, dressing, etc )  - Assess/evaluate cause of self-care deficits   - Assess range of motion  - Assess patient's mobility; develop plan if impaired  - Assess patient's need for assistive devices and provide as appropriate  - Encourage maximum independence but intervene and supervise when necessary  - Involve family in performance of ADLs  - Assess for home care needs following discharge   - Consider OT consult to assist with ADL evaluation and planning for discharge  - Provide patient education as appropriate  Outcome: Progressing  Goal: Maintains/Returns to pre admission functional level  Description: INTERVENTIONS:  - Perform BMAT or MOVE assessment daily    - Set and communicate daily mobility goal to care team and patient/family/caregiver  - Collaborate with rehabilitation services on mobility goals if consulted  - Perform Range of Motion 2 times a day  - Reposition patient every 2  hours  - Dangle patient 2 times a day  - Stand patient 2  times a day  - Ambulate patient 2  times a day  - Out of bed to chair 2  times a day   - Out of bed for meals 2  times a day  - Out of bed for toileting  - Record patient progress and toleration of activity level   Outcome: Progressing     Problem: DISCHARGE PLANNING  Goal: Discharge to home or other facility with appropriate resources  Description: INTERVENTIONS:  - Identify barriers to discharge w/patient and caregiver  - Arrange for needed discharge resources and transportation as appropriate  - Identify discharge learning needs (meds, wound care, etc )  - Arrange for interpretive services to assist at discharge as needed  - Refer to Case Management Department for coordinating discharge planning if the patient needs post-hospital services based on physician/advanced practitioner order or complex needs related to functional status, cognitive ability, or social support system  Outcome: Progressing     Problem: Knowledge Deficit  Goal: Patient/family/caregiver demonstrates understanding of disease process, treatment plan, medications, and discharge instructions  Description: Complete learning assessment and assess knowledge base  Interventions:  - Provide teaching at level of understanding  - Provide teaching via preferred learning methods  Outcome: Progressing     Problem: Nutrition/Hydration-ADULT  Goal: Nutrient/Hydration intake appropriate for improving, restoring or maintaining nutritional needs  Description: Monitor and assess patient's nutrition/hydration status for malnutrition   Collaborate with interdisciplinary team and initiate plan and interventions as ordered  Monitor patient's weight and dietary intake as ordered or per policy  Utilize nutrition screening tool and intervene as necessary  Determine patient's food preferences and provide high-protein, high-caloric foods as appropriate       INTERVENTIONS:  - Monitor oral intake, urinary output, labs, and treatment plans  - Assess nutrition and hydration status and recommend course of action  - Evaluate amount of meals eaten  - Assist patient with eating if necessary   - Allow adequate time for meals  - Recommend/ encourage appropriate diets, oral nutritional supplements, and vitamin/mineral supplements  - Order, calculate, and assess calorie counts as needed  - Recommend, monitor, and adjust tube feedings and TPN/PPN based on assessed needs  - Assess need for intravenous fluids  - Provide specific nutrition/hydration education as appropriate  - Include patient/family/caregiver in decisions related to nutrition  Outcome: Progressing

## 2021-08-28 NOTE — PROGRESS NOTES
INTERNAL MEDICINE RESIDENCY PROGRESS NOTE     Name: Emily Coffey   Age & Sex: 31 y o  female   MRN: [id number]  Unit/Bed#: -01   Encounter: [id number]  Team: SOD Team A    PATIENT INFORMATION     Name: Emily Coffey   Age & Sex: 31 y o  female   MRN: [id number] Stay Days: 2    ASSESSMENT/PLAN     Principal Problem:    COVID-19  Active Problems:    Vomiting    Opioid dependence ([address] )    Tobacco dependence    Trichimoniasis    Hypokalemia    Anemia    Hepatitis C      Hepatitis C  Assessment & Plan  Patient reports testing positive for Hep C in 2015  She has not been treated for it  Last known viral load 095340 (7/21/20)  She states she was waiting to get clean before seeking treatment  Hepatitis Panel with viral load     Plan:  · Hepatitis-C PCR pending  · Outpatient GI/hepatologist follow-up     Anemia  Assessment & Plan  Patient vomited red colored emesis on 2 occasions  However she reportedly had drink red color Gatorade and It was unclear whether this was true hematemesis  Current hemoglobin of 9 8  Per chart review her baseline appears to be from 11 5-12  She is not actively bleeding right now  Lab Results   Component Value Date/Time    HGB 9 7 (L) 08/27/2021 02:35 AM    RDW 18 1 (H) 08/27/2021 02:35 AM    HCT 29 2 (L) 08/27/2021 02:35 AM    MCV 87 08/27/2021 02:35 AM    MCHC 33 2 08/27/2021 02:35 AM     Plan:  · Peripheral smear pending  · Ferrous sulfate 325 mg daily  · Patient is not actively bleeding right now and consider outpatient follow-up for further eval      Hypokalemia  Assessment & Plan  Lab Results   Component Value Date/Time    K 3 2 (L) 08/25/2021 06:24 PM     Hypokalemia in the setting of emesis during admission  2/2 volume depletion     Plan:  · Potassium repletion with K-dur 40 mEq x 2 doses  · Continue monitoring BMP  · Replete potassium as needed        Trichimoniasis  Assessment & Plan  UA was significant for moderate WBC and innumeral bacteria, positive for Trichomonas  Patient denied symptoms of vaginitis  She was given 2 g of Flagyl in the ED  Plan:  · Outpatient follow-up after 2 weeks for for repeat testing with NAAT to confirm eradication          Tobacco dependence  Assessment & Plan  Patient smokes half pack per day for the past 5 years  Patient offered nicotine replacement therapy  Patient refused  Patient was counseled on tobacco cessation    Opioid dependence [facility])  Assessment & Plan  Patient has a history of opioid abuse  Currently in rehab at [facility] detox center  No signs of withdrawal     Plan:  · Continue Suboxone 8 mg     Vomiting  Assessment & Plan  2 episodes of vomiting today prior to transfer to [facility] for evaluation 2/2 COVID infection vs gabapentin side effect     Plan:  · Continue Zofran IV PRN      * COVID-19  Assessment & Plan  Lab Results   Component Value Date/Time    TROPONINI <0 02 08/27/2021 02:07 AM    CKTOTAL 135 08/26/2021 04:57 AM    NTBNP 542 (H) 08/26/2021 04:57 AM    PROCALCITONI 4 71 (H) 08/27/2021 02:18 AM    LACTICACID 0 9 08/25/2021 06:24 PM    WBC 11 79 (H) 08/27/2021 02:35 AM     29-year-old female with symptom onset, including nausea and vomiting, 1 day prior tested positive for COVID  For was significant for shortness of breath, upper respiratory tract infection symptoms, chest pain, abdominal pain, diarrhea, leg swelling  Patient is tachycardic but on room air with SpO2 97%  EKG demonstrates sinus tachycardia  CTA chest findings are indicative of acute COVID infection, and negative for PE  However procalcitonin was elevated 3 06 on admission, increased to 6 13 after  Superficial bacterial infection in the setting of COVID pneumonia suspected      Plan:  · Mild COVID pathway   · Therapeutic enoxaparin for anticoagulation - discontinued  · Patient apparently started on ceftriaxone 1000 mg and doxycycline 100 mg p o  on admission admission - discontinued  · IV vancomycin and cefepime started on   · MRSA culture pending  · Blood cultures pending  · Continue to trend procalcitonin and lactate        Disposition: Continue IV antibiotics  Procalcitonin improving  MRSA nasal swab pending  SUBJECTIVE     Patient seen and examined  No acute events overnight  Overall patient is doing well  She denies fever, chills, cough, chest pain or shortness of breath  Abdominal pain is resolved  OBJECTIVE     Vitals:    21 0829 21 0833 21 2159 21 2336   BP: 124/80 124/80  121/80   BP Location:       Pulse:       Resp:       Temp: 99 5 °F (37 5 °C) 99 5 °F (37 5 °C) 100 5 °F (38 1 °C) 99 3 °F (37 4 °C)   TempSrc:       SpO2:       Weight:       Height:          Temperature:   Temp (24hrs), Av 7 °F (37 6 °C), Min:99 3 °F (37 4 °C), Max:100 5 °F (38 1 °C)    Temperature: 99 3 °F (37 4 °C)  Intake & Output:  I/O        07 -  0700  07 -  0700  07 -  0700    P  O  340 0     I V  (mL/kg) 750 5 (14)      Total Intake(mL/kg) 1090 5 (20 4) 0 (0)     Net +1090 5 0                Weights:   IBW (Ideal Body Weight): 47 8 kg    Body mass index is 22 3 kg/m²  Weight (last 2 days)     Date/Time   Weight    21 0300   53 5 (118)            Physical Exam  Vitals and nursing note reviewed  Constitutional:       General: She is not in acute distress  Appearance: She is well-developed  HENT:      Head: Normocephalic and atraumatic  Eyes:      Conjunctiva/sclera: Conjunctivae normal    Cardiovascular:      Rate and Rhythm: Normal rate and regular rhythm  Heart sounds: No murmur heard  Pulmonary:      Effort: Pulmonary effort is normal  No respiratory distress  Breath sounds: Normal breath sounds  Abdominal:      Palpations: Abdomen is soft  Tenderness: There is no abdominal tenderness  Musculoskeletal:      Cervical back: Neck supple  Skin:     General: Skin is warm and dry  Neurological:      General: No focal deficit present  Mental Status: She is alert and oriented to person, place, and time  Psychiatric:         Mood and Affect: Mood normal          Behavior: Behavior normal        LABORATORY DATA     Labs: I have personally reviewed pertinent reports  Results from last 7 days   Lab Units 08/28/21  0615 08/27/21  0235 08/26/21  0457 08/25/21  1824   WBC Thousand/uL 13 44* 11 79* 12 60* 10 41*   HEMOGLOBIN g/dL 10 7* 9 7* 9 9* 9 8*   HEMATOCRIT % 34 1* 29 2* 30 4* 30 1*   PLATELETS Thousands/uL 254 251 272 269   NEUTROS PCT %  --  90*  --   --    MONOS PCT %  --  3*  --   --    MONO PCT %  --   --   --  2*      Results from last 7 days   Lab Units 08/28/21  0615 08/27/21  0200 08/26/21  0457 08/25/21  1824   POTASSIUM mmol/L 3 6 3 2* 3 7 3 2*   CHLORIDE mmol/L 110* 108 109* 109*   CO2 mmol/L 20* 25 23 21   BUN mg/dL 7 9 10 13   CREATININE mg/dL 0 29* 0 52* 0 60 0 72   CALCIUM mg/dL 7 3* 7 2* 7 6* 7 6*   ALK PHOS U/L  --   --  300* 344*   ALT U/L  --   --  34 37   AST U/L  --   --  47* 61*     Results from last 7 days   Lab Units 08/26/21  0457   MAGNESIUM mg/dL 2 0          Results from last 7 days   Lab Units 08/25/21  1824   INR  1 24*   PTT seconds 37     Results from last 7 days   Lab Units 08/25/21  1824   LACTIC ACID mmol/L 0 9     Results from last 7 days   Lab Units 08/27/21  0207 08/26/21  0009   TROPONIN I ng/mL <0 02 <0 02       IMAGING & DIAGNOSTIC TESTING     Radiology Results: I have personally reviewed pertinent reports  CTA ED chest PE Study    Result Date: 8/25/2021  Impression: 1  Multifocal patchy and groundglass airspace opacity, particularly in the right upper and lower lobes In the setting of clinically suspected/proven COVID-19, the above lung parenchymal findings on CT indicate intermediate confidence level for COVID-19  2   No acute pulmonary embolism  3   Partially imaged liver and spleen appear enlarged  If there is clinical concern, this can be further evaluated with ultrasound   The study was marked in EPIC for immediate notification  Workstation performed: [id number]     Other Diagnostic Testing: I have personally reviewed pertinent reports  ACTIVE MEDICATIONS     Current Facility-Administered Medications   Medication Dose Route Frequency    acetaminophen (TYLENOL) tablet 650 mg  650 mg Oral Q6H PRN    buprenorphine-naloxone (Suboxone) film 8 mg  8 mg Sublingual BID    buPROPion (WELLBUTRIN XL) 24 hr tablet 300 mg  300 mg Oral QAM    cefepime (MAXIPIME) 2,000 mg in dextrose 5 % 50 mL IVPB  2,000 mg Intravenous Q8H    ferrous sulfate tablet 325 mg  325 mg Oral Daily With Breakfast    folic acid (FOLVITE) tablet 1 mg  1 mg Oral Daily    multi-electrolyte (PLASMALYTE-A/ISOLYTE-S PH 7 4) IV solution  125 mL/hr Intravenous Continuous    ondansetron (ZOFRAN) injection 4 mg  4 mg Intravenous Q8H PRN    sodium chloride (PF) 0 9 % injection 3 mL  3 mL Intravenous Q1H PRN    thiamine tablet 100 mg  100 mg Oral Daily    vancomycin (VANCOCIN) 1,250 mg in sodium chloride 0 9 % 250 mL IVPB  25 mg/kg Intravenous Q8H       Portions of the record may have been created with voice recognition software  Occasional wrong word or "sound a like" substitutions may have occurred due to the inherent limitations of voice recognition software    Read the chart carefully and recognize, using context, where substitutions have occurred   ==  Muna Cobb MD  [address] [facility]  Internal Medicine Residency PGY-1

## 2021-08-29 LAB
ANION GAP SERPL CALCULATED.3IONS-SCNC: 3 MMOL/L (ref 4–13)
BASOPHILS # BLD AUTO: 0.02 THOUSANDS/ΜL (ref 0–0.1)
BASOPHILS NFR BLD AUTO: 0 % (ref 0–1)
BUN SERPL-MCNC: 4 MG/DL (ref 5–25)
CALCIUM SERPL-MCNC: 7.1 MG/DL (ref 8.3–10.1)
CHLORIDE SERPL-SCNC: 108 MMOL/L (ref 100–108)
CO2 SERPL-SCNC: 25 MMOL/L (ref 21–32)
CREAT SERPL-MCNC: 0.33 MG/DL (ref 0.6–1.3)
EOSINOPHIL # BLD AUTO: 0.3 THOUSAND/ΜL (ref 0–0.61)
EOSINOPHIL NFR BLD AUTO: 3 % (ref 0–6)
ERYTHROCYTE [DISTWIDTH] IN BLOOD BY AUTOMATED COUNT: 18.5 % (ref 11.6–15.1)
GFR SERPL CREATININE-BSD FRML MDRD: 154 ML/MIN/1.73SQ M
GLUCOSE SERPL-MCNC: 78 MG/DL (ref 65–140)
HCT VFR BLD AUTO: 32.4 % (ref 34.8–46.1)
HGB BLD-MCNC: 10.4 G/DL (ref 11.5–15.4)
IMM GRANULOCYTES # BLD AUTO: 0.09 THOUSAND/UL (ref 0–0.2)
IMM GRANULOCYTES NFR BLD AUTO: 1 % (ref 0–2)
LYMPHOCYTES # BLD AUTO: 0.77 THOUSANDS/ΜL (ref 0.6–4.47)
LYMPHOCYTES NFR BLD AUTO: 8 % (ref 14–44)
MCH RBC QN AUTO: 28.3 PG (ref 26.8–34.3)
MCHC RBC AUTO-ENTMCNC: 32.1 G/DL (ref 31.4–37.4)
MCV RBC AUTO: 88 FL (ref 82–98)
MONOCYTES # BLD AUTO: 0.42 THOUSAND/ΜL (ref 0.17–1.22)
MONOCYTES NFR BLD AUTO: 4 % (ref 4–12)
NEUTROPHILS # BLD AUTO: 8.58 THOUSANDS/ΜL (ref 1.85–7.62)
NEUTS SEG NFR BLD AUTO: 84 % (ref 43–75)
NRBC BLD AUTO-RTO: 0 /100 WBCS
PLATELET # BLD AUTO: 239 THOUSANDS/UL (ref 149–390)
PMV BLD AUTO: 10.9 FL (ref 8.9–12.7)
POTASSIUM SERPL-SCNC: 3.4 MMOL/L (ref 3.5–5.3)
PROCALCITONIN SERPL-MCNC: 1.06 NG/ML
RBC # BLD AUTO: 3.67 MILLION/UL (ref 3.81–5.12)
SODIUM SERPL-SCNC: 136 MMOL/L (ref 136–145)
VANCOMYCIN TROUGH SERPL-MCNC: 16.9 UG/ML (ref 10–20)
WBC # BLD AUTO: 10.18 THOUSAND/UL (ref 4.31–10.16)

## 2021-08-29 PROCEDURE — 84145 PROCALCITONIN (PCT): CPT

## 2021-08-29 PROCEDURE — 80048 BASIC METABOLIC PNL TOTAL CA: CPT | Performed by: STUDENT IN AN ORGANIZED HEALTH CARE EDUCATION/TRAINING PROGRAM

## 2021-08-29 PROCEDURE — 85025 COMPLETE CBC W/AUTO DIFF WBC: CPT | Performed by: STUDENT IN AN ORGANIZED HEALTH CARE EDUCATION/TRAINING PROGRAM

## 2021-08-29 PROCEDURE — NC001 PR NO CHARGE: Performed by: INTERNAL MEDICINE

## 2021-08-29 PROCEDURE — 80202 ASSAY OF VANCOMYCIN: CPT | Performed by: INTERNAL MEDICINE

## 2021-08-29 RX ORDER — BUPRENORPHINE AND NALOXONE 2; .5 MG/1; MG/1
4 FILM, SOLUBLE BUCCAL; SUBLINGUAL ONCE
Status: COMPLETED | OUTPATIENT
Start: 2021-08-29 | End: 2021-08-29

## 2021-08-29 RX ADMIN — ACETAMINOPHEN 650 MG: 325 TABLET, FILM COATED ORAL at 09:47

## 2021-08-29 RX ADMIN — VANCOMYCIN HYDROCHLORIDE 1500 MG: 1 INJECTION, POWDER, LYOPHILIZED, FOR SOLUTION INTRAVENOUS at 02:24

## 2021-08-29 RX ADMIN — CEFEPIME HYDROCHLORIDE 2000 MG: 2 INJECTION, POWDER, FOR SOLUTION INTRAVENOUS at 23:57

## 2021-08-29 RX ADMIN — ONDANSETRON 4 MG: 2 INJECTION INTRAMUSCULAR; INTRAVENOUS at 08:59

## 2021-08-29 RX ADMIN — VANCOMYCIN HYDROCHLORIDE 1500 MG: 1 INJECTION, POWDER, LYOPHILIZED, FOR SOLUTION INTRAVENOUS at 09:06

## 2021-08-29 RX ADMIN — CEFEPIME HYDROCHLORIDE 2000 MG: 2 INJECTION, POWDER, FOR SOLUTION INTRAVENOUS at 16:24

## 2021-08-29 RX ADMIN — BUPROPION HYDROCHLORIDE 300 MG: 150 TABLET, FILM COATED, EXTENDED RELEASE ORAL at 09:47

## 2021-08-29 RX ADMIN — FERROUS SULFATE TAB 325 MG (65 MG ELEMENTAL FE) 325 MG: 325 (65 FE) TAB at 06:10

## 2021-08-29 RX ADMIN — THIAMINE HCL TAB 100 MG 100 MG: 100 TAB at 09:49

## 2021-08-29 RX ADMIN — ACETAMINOPHEN 650 MG: 325 TABLET, FILM COATED ORAL at 18:21

## 2021-08-29 RX ADMIN — FOLIC ACID 1 MG: 1 TABLET ORAL at 09:47

## 2021-08-29 RX ADMIN — BUPRENORPHINE AND NALOXONE 8 MG: 8; 2 FILM BUCCAL; SUBLINGUAL at 17:58

## 2021-08-29 RX ADMIN — ACETAMINOPHEN 650 MG: 325 TABLET, FILM COATED ORAL at 01:09

## 2021-08-29 RX ADMIN — CEFEPIME HYDROCHLORIDE 2000 MG: 2 INJECTION, POWDER, FOR SOLUTION INTRAVENOUS at 08:16

## 2021-08-29 RX ADMIN — POTASSIUM CHLORIDE 20 MEQ: 1500 TABLET, EXTENDED RELEASE ORAL at 09:48

## 2021-08-29 RX ADMIN — BUPRENORPHINE AND NALOXONE 4 MG: 2; .5 FILM BUCCAL; SUBLINGUAL at 10:10

## 2021-08-29 RX ADMIN — VANCOMYCIN HYDROCHLORIDE 1500 MG: 1 INJECTION, POWDER, LYOPHILIZED, FOR SOLUTION INTRAVENOUS at 17:58

## 2021-08-29 RX ADMIN — CEFEPIME HYDROCHLORIDE 2000 MG: 2 INJECTION, POWDER, FOR SOLUTION INTRAVENOUS at 00:44

## 2021-08-29 NOTE — PROGRESS NOTES
Vancomycin IV Pharmacy-to-Dose Consultation    Anish Gabriel is a 31 y o  female who is currently receiving Vancomycin IV with management by the Pharmacy Consult service  Assessment/Plan:  The patient was reviewed  Renal function is stable and no signs or symptoms of nephrotoxicity and/or infusion reactions were documented in the chart  Based on todays trough of 16 9, continue current vancomycin (day # 4) dosing of 1500 mg q8h, with a plan for trough to be drawn at 0930 on 9/2  We will continue to follow the patients culture results and clinical progress daily      Lillian Camarena, Pharmacist

## 2021-08-29 NOTE — PROGRESS NOTES
INTERNAL MEDICINE RESIDENCY PROGRESS NOTE     Name: Boaz France   Age & Sex: 31 y o  female   MRN: [id number]  Unit/Bed#: -01   Encounter: [id number]  Team: SOD Team A    PATIENT INFORMATION     Name: Boaz France   Age & Sex: 31 y o  female   MRN: [id number] [facility] Stay Days: 3    ASSESSMENT/PLAN     Disposition:  MRSA nasal swab positive  Continue IV vancomycin and cefepime  Procalcitonin improving  Patient spiking mild fevers overnight, continue to monitor for progression of COVID-19  Not yet medically clear for discharge  * COVID-19  Assessment & Plan  Lab Results   Component Value Date/Time    TROPONINI <0 02 08/27/2021 02:07 AM    CKTOTAL 135 08/26/2021 04:57 AM    NTBNP 542 (H) 08/26/2021 04:57 AM    PROCALCITONI 2 60 (H) 08/28/2021 06:23 AM    LACTICACID 0 9 08/25/2021 06:24 PM    WBC 13 44 (H) 08/28/2021 06:15 AM     58-year-old female with symptom onset, including nausea and vomiting, 1 day prior tested positive for COVID  For was significant for shortness of breath, upper respiratory tract infection symptoms, chest pain, abdominal pain, diarrhea, leg swelling  Patient is tachycardic but on room air with SpO2 97%  EKG demonstrates sinus tachycardia  CTA chest findings are indicative of acute COVID infection, and negative for PE  However procalcitonin was elevated 3 06 on admission, increased to 6 13 after  Superficial bacterial infection in the setting of COVID pneumonia suspected  Plan:  · Mild COVID pathway   · Therapeutic enoxaparin for anticoagulation - discontinued  · Patient apparently started on ceftriaxone 1000 mg and doxycycline 100 mg p o  on admission admission - discontinued  · IV vancomycin and cefepime started on 8/26  · MRSA positive  · Blood cultures remain negative at 72 hours  · Continue to trend procalcitonin and lactate    Hepatitis C  Assessment & Plan  Patient reports testing positive for Hep C in 2015  She has not been treated for it   Last known viral load 209099 (7/21/20)  She states she was waiting to get clean before seeking treatment  Hepatitis Panel with viral load     Plan:  · Hepatitis-C PCR pending  · Outpatient GI/hepatologist follow-up     Anemia  Assessment & Plan  Patient vomited red colored emesis on 2 occasions  However she reportedly had drink red color Gatorade and It was unclear whether this was true hematemesis  Current hemoglobin of 9 8  Per chart review her baseline appears to be from 11 5-12  She is not actively bleeding right now  Lab Results   Component Value Date/Time    HGB 9 7 (L) 08/27/2021 02:35 AM    RDW 18 1 (H) 08/27/2021 02:35 AM    HCT 29 2 (L) 08/27/2021 02:35 AM    MCV 87 08/27/2021 02:35 AM    MCHC 33 2 08/27/2021 02:35 AM     Plan:  · Peripheral smear pending  · Ferrous sulfate 325 mg daily  · Patient is not actively bleeding right now and consider outpatient follow-up for further eval      Hypokalemia  Assessment & Plan  Lab Results   Component Value Date/Time    K 3 2 (L) 08/25/2021 06:24 PM     Hypokalemia in the setting of emesis during admission  2/2 volume depletion     Plan:  · Potassium repletion with K-dur 40 mEq x 2 doses  · Continue monitoring BMP  · Replete potassium as needed  Trichimoniasis  Assessment & Plan  UA was significant for moderate WBC and innumeral bacteria, positive for Trichomonas  Patient denied symptoms of vaginitis  She was given 2 g of Flagyl in the ED  Plan:  · Outpatient follow-up after 2 weeks for for repeat testing with NAAT to confirm eradication          Tobacco dependence  Assessment & Plan  Patient smokes half pack per day for the past 5 years  Patient offered nicotine replacement therapy  Patient refused  Patient was counseled on tobacco cessation    Opioid dependence [facility])  Assessment & Plan  Patient has a history of opioid abuse  Currently in rehab at [facility] detox center    No signs of withdrawal     Plan:  · Continue Suboxone 8 mg Vomiting  Assessment & Plan  2 episodes of vomiting today prior to [facility] 2/2 COVID infection vs gabapentin side effect     Plan:  · Continue Zofran IV PRN        SUBJECTIVE     Patient seen and examined  No acute events overnight  Patient reports that upon attempting to take her oral medications including sublingual Suboxone this morning she immediately vomited  Patient reports that she did not feel nauseous at that time, and relates that to the large amount of pills she had a swallow  Will administer Zofran and retry oral meds and 4 mg Suboxone more cautiously  Patient otherwise denies any worsening shortness of breath for subjective fevers  Patient denies any chest pain, nausea, vomiting, diarrhea  OBJECTIVE     Vitals:    21 1729 21 0101 21 0833 21 0958   BP: 121/80 121/82 125/64    BP Location: Right arm Right arm Right arm    Pulse:  (!) 118  (!) 115   Resp: 18 19 18    Temp: 100 5 °F (38 1 °C) (!) 100 8 °F (38 2 °C) (!) 100 6 °F (38 1 °C)    TempSrc: Oral Oral Oral    SpO2:    98%   Weight:       Height:          Temperature:   Temp (24hrs), Av 6 °F (38 1 °C), Min:100 5 °F (38 1 °C), Max:100 8 °F (38 2 °C)    Temperature: (!) 100 6 °F (38 1 °C)  Intake & Output:  I/O        07 -  0700  07 -  0700  07 -  0700    P  O  0 458     I V  (mL/kg)       Total Intake(mL/kg) 0 (0) 458 (8 6)     Net 0 +458            Unmeasured Urine Occurrence  2 x     Unmeasured Emesis Occurrence   1 x        Weights:   IBW (Ideal Body Weight): 47 8 kg    Body mass index is 22 3 kg/m²  Weight (last 2 days)     None          Physical Exam:   General: Awake, alert, and conversant  No acute distress  Eyes: Normal conjunctiva, anicteric  Round symmetric pupils  ENT: Hearing grossly intact  No nasal discharge  Neck: Neck is supple  No masses or thyromegaly  Respiratory:  Clear to auscultation bilaterally    Respirations are non-labored  No wheezing  Cardiovascular:  Tachycardic  Regular rhythm  No lower extremity edema  Skin: Warm  No rashes or ulcers  Neuro: A&O x 3  Sensation and CN II-XII grossly normal    Psych: Cooperative  Appropriate mood and affect  Normal judgement  LABORATORY DATA     Labs: I have personally reviewed pertinent reports  Results from last 7 days   Lab Units 08/29/21  0706 08/28/21  0615 08/27/21  0235 08/25/21  1824   WBC Thousand/uL 10 18* 13 44* 11 79* 10 41*   HEMOGLOBIN g/dL 10 4* 10 7* 9 7* 9 8*   HEMATOCRIT % 32 4* 34 1* 29 2* 30 1*   PLATELETS Thousands/uL 239 254 251 269   NEUTROS PCT % 84*  --  90*  --    MONOS PCT % 4  --  3*  --    MONO PCT %  --   --   --  2*      Results from last 7 days   Lab Units 08/29/21  0706 08/28/21  0615 08/27/21  0200 08/26/21  0457 08/25/21  1824   POTASSIUM mmol/L 3 4* 3 6 3 2* 3 7 3 2*   CHLORIDE mmol/L 108 110* 108 109* 109*   CO2 mmol/L 25 20* 25 23 21   BUN mg/dL 4* 7 9 10 13   CREATININE mg/dL 0 33* 0 29* 0 52* 0 60 0 72   CALCIUM mg/dL 7 1* 7 3* 7 2* 7 6* 7 6*   ALK PHOS U/L  --   --   --  300* 344*   ALT U/L  --   --   --  34 37   AST U/L  --   --   --  47* 61*     Results from last 7 days   Lab Units 08/26/21  0457   MAGNESIUM mg/dL 2 0          Results from last 7 days   Lab Units 08/25/21  1824   INR  1 24*   PTT seconds 37     Results from last 7 days   Lab Units 08/25/21  1824   LACTIC ACID mmol/L 0 9     Results from last 7 days   Lab Units 08/27/21  0207 08/26/21  0009   TROPONIN I ng/mL <0 02 <0 02       IMAGING & DIAGNOSTIC TESTING     Radiology Results: I have personally reviewed pertinent reports  CTA ED chest PE Study    Result Date: 8/25/2021  Impression: 1  Multifocal patchy and groundglass airspace opacity, particularly in the right upper and lower lobes In the setting of clinically suspected/proven COVID-19, the above lung parenchymal findings on CT indicate intermediate confidence level for COVID-19  2   No acute pulmonary embolism  3   Partially imaged liver and spleen appear enlarged  If there is clinical concern, this can be further evaluated with ultrasound  The study was marked in [facility] for immediate notification  Workstation performed: [id number]     Other Diagnostic Testing: I have personally reviewed pertinent reports  ACTIVE MEDICATIONS     Current Facility-Administered Medications   Medication Dose Route Frequency    acetaminophen (TYLENOL) tablet 650 mg  650 mg Oral Q6H PRN    buprenorphine-naloxone (Suboxone) film 4 mg  4 mg Sublingual Once    buprenorphine-naloxone (Suboxone) film 8 mg  8 mg Sublingual BID    buPROPion (WELLBUTRIN XL) 24 hr tablet 300 mg  300 mg Oral QAM    cefepime (MAXIPIME) 2,000 mg in dextrose 5 % 50 mL IVPB  2,000 mg Intravenous Q8H    ferrous sulfate tablet 325 mg  325 mg Oral Daily With Breakfast    folic acid (FOLVITE) tablet 1 mg  1 mg Oral Daily    multi-electrolyte (PLASMALYTE-A/ISOLYTE-S PH 7 4) IV solution  125 mL/hr Intravenous Continuous    ondansetron (ZOFRAN) injection 4 mg  4 mg Intravenous Q8H PRN    potassium chloride (K-DUR,KLOR-CON) CR tablet 20 mEq  20 mEq Oral Daily    sodium chloride (PF) 0 9 % injection 3 mL  3 mL Intravenous Q1H PRN    thiamine tablet 100 mg  100 mg Oral Daily    vancomycin (VANCOCIN) 1500 mg in sodium chloride 0 9% 250 mL IVPB  1,500 mg Intravenous Q8H       VTE Pharmacologic Prophylaxis:  No AC patient is not requiring oxygen, does not need DVT prophylaxis  VTE Mechanical Prophylaxis: sequential compression device    Portions of the record may have been created with voice recognition software  Occasional wrong word or "sound a like" substitutions may have occurred due to the inherent limitations of voice recognition software  Read the chart carefully and recognize, using context, where substitutions have occurred    ==  Brett Gutierrez, [address] [facility]  Internal Medicine Residency PGY-1

## 2021-08-29 NOTE — PLAN OF CARE
Problem: Potential for Falls  Goal: Patient will remain free of falls  Description: INTERVENTIONS:  - Educate patient/family on patient safety including physical limitations  - Instruct patient to call for assistance with activity   - Consult OT/PT to assist with strengthening/mobility   - Keep Call bell within reach  - Keep bed low and locked with side rails adjusted as appropriate  - Keep care items and personal belongings within reach  - Initiate and maintain comfort rounds  - Make Fall Risk Sign visible to staff  - Offer Toileting every 2 Hours, in advance of need  - Initiate/Maintain bed alarm  - Obtain necessary fall risk management equipment: bed alarm  - Apply yellow socks and bracelet for high fall risk patients  - Consider moving patient to room near nurses station  Outcome: Progressing     Problem: PAIN - ADULT  Goal: Verbalizes/displays adequate comfort level or baseline comfort level  Description: Interventions:  - Encourage patient to monitor pain and request assistance  - Assess pain using appropriate pain scale  - Administer analgesics based on type and severity of pain and evaluate response  - Implement non-pharmacological measures as appropriate and evaluate response  - Consider cultural and social influences on pain and pain management  - Notify physician/advanced practitioner if interventions unsuccessful or patient reports new pain  Outcome: Progressing     Problem: INFECTION - ADULT  Goal: Absence or prevention of progression during hospitalization  Description: INTERVENTIONS:  - Assess and monitor for signs and symptoms of infection  - Monitor lab/diagnostic results  - Monitor all insertion sites, i e  indwelling lines, tubes, and drains  - Monitor endotracheal if appropriate and nasal secretions for changes in amount and color  - [place] appropriate cooling/warming therapies per order  - Administer medications as ordered  - Instruct and encourage patient and family to use good hand hygiene technique  - Identify and instruct in appropriate isolation precautions for identified infection/condition  Outcome: Progressing  Goal: Absence of fever/infection during neutropenic period  Description: INTERVENTIONS:  - Monitor WBC    Outcome: Progressing     Problem: SAFETY ADULT  Goal: Patient will remain free of falls  Description: INTERVENTIONS:  - Educate patient/family on patient safety including physical limitations  - Instruct patient to call for assistance with activity   - Consult OT/PT to assist with strengthening/mobility   - Keep Call bell within reach  - Keep bed low and locked with side rails adjusted as appropriate  - Keep care items and personal belongings within reach  - Initiate and maintain comfort rounds  - Make Fall Risk Sign visible to staff  - Offer Toileting every 2 Hours, in advance of need  - Initiate/Maintain bed alarm  - Obtain necessary fall risk management equipment: bed alarm  - Apply yellow socks and bracelet for high fall risk patients  - Consider moving patient to room near nurses station  Outcome: Progressing  Goal: Maintain or return to baseline ADL function  Description: INTERVENTIONS:  -  Assess patient's ability to carry out ADLs; assess patient's baseline for ADL function and identify physical deficits which impact ability to perform ADLs (bathing, care of mouth/teeth, toileting, grooming, dressing, etc )  - Assess/evaluate cause of self-care deficits   - Assess range of motion  - Assess patient's mobility; develop plan if impaired  - Assess patient's need for assistive devices and provide as appropriate  - Encourage maximum independence but intervene and supervise when necessary  - Involve family in performance of ADLs  - Assess for home care needs following discharge   - Consider OT consult to assist with ADL evaluation and planning for discharge  - Provide patient education as appropriate  Outcome: Progressing  Goal: Maintains/Returns to pre admission functional level  Description: INTERVENTIONS:  - Perform BMAT or MOVE assessment daily    - Set and communicate daily mobility goal to care team and patient/family/caregiver  - Collaborate with rehabilitation services on mobility goals if consulted  - Perform Range of Motion 2 times a day  - Reposition patient every 2 hours  - Dangle patient 2 times a day  - Stand patient 2 times a day  - Ambulate patient 2 times a day  - Out of bed to chair 2 times a day   - Out of bed for meals 2 times a day  - Out of bed for toileting  - Record patient progress and toleration of activity level   Outcome: Progressing     Problem: DISCHARGE PLANNING  Goal: Discharge to home or other facility with appropriate resources  Description: INTERVENTIONS:  - Identify barriers to discharge w/patient and caregiver  - Arrange for needed discharge resources and transportation as appropriate  - Identify discharge learning needs (meds, wound care, etc )  - Arrange for interpretive services to assist at discharge as needed  - Refer to Case Management Department for coordinating discharge planning if the patient needs post-hospital services based on physician/advanced practitioner order or complex needs related to functional status, cognitive ability, or social support system  Outcome: Progressing     Problem: Knowledge Deficit  Goal: Patient/family/caregiver demonstrates understanding of disease process, treatment plan, medications, and discharge instructions  Description: Complete learning assessment and assess knowledge base  Interventions:  - Provide teaching at level of understanding  - Provide teaching via preferred learning methods  Outcome: Progressing     Problem: Nutrition/Hydration-ADULT  Goal: Nutrient/Hydration intake appropriate for improving, restoring or maintaining nutritional needs  Description: Monitor and assess patient's nutrition/hydration status for malnutrition   Collaborate with interdisciplinary team and initiate plan and interventions as ordered  Monitor patient's weight and dietary intake as ordered or per policy  Utilize nutrition screening tool and intervene as necessary  Determine patient's food preferences and provide high-protein, high-caloric foods as appropriate       INTERVENTIONS:  - Monitor oral intake, urinary output, labs, and treatment plans  - Assess nutrition and hydration status and recommend course of action  - Evaluate amount of meals eaten  - Assist patient with eating if necessary   - Allow adequate time for meals  - Recommend/ encourage appropriate diets, oral nutritional supplements, and vitamin/mineral supplements  - Order, calculate, and assess calorie counts as needed  - Recommend, monitor, and adjust tube feedings and TPN/PPN based on assessed needs  - Assess need for intravenous fluids  - Provide specific nutrition/hydration education as appropriate  - Include patient/family/caregiver in decisions related to nutrition  Outcome: Progressing

## 2021-08-30 LAB
ANION GAP SERPL CALCULATED.3IONS-SCNC: 5 MMOL/L (ref 4–13)
ARTIFACT: PRESENT
BACTERIA BLD CULT: NORMAL
BACTERIA BLD CULT: NORMAL
BASOPHILS # BLD MANUAL: 0.07 THOUSAND/UL (ref 0–0.1)
BASOPHILS NFR MAR MANUAL: 1 % (ref 0–1)
BUN SERPL-MCNC: 2 MG/DL (ref 5–25)
CALCIUM SERPL-MCNC: 7.1 MG/DL (ref 8.3–10.1)
CHLORIDE SERPL-SCNC: 105 MMOL/L (ref 100–108)
CO2 SERPL-SCNC: 26 MMOL/L (ref 21–32)
CREAT SERPL-MCNC: 0.24 MG/DL (ref 0.6–1.3)
EOSINOPHIL # BLD MANUAL: 0 THOUSAND/UL (ref 0–0.4)
EOSINOPHIL NFR BLD MANUAL: 0 % (ref 0–6)
ERYTHROCYTE [DISTWIDTH] IN BLOOD BY AUTOMATED COUNT: 18.6 % (ref 11.6–15.1)
GFR SERPL CREATININE-BSD FRML MDRD: 171 ML/MIN/1.73SQ M
GLUCOSE SERPL-MCNC: 72 MG/DL (ref 65–140)
HCT VFR BLD AUTO: 30 % (ref 34.8–46.1)
HGB BLD-MCNC: 9.7 G/DL (ref 11.5–15.4)
LYMPHOCYTES # BLD AUTO: 0.14 THOUSAND/UL (ref 0.6–4.47)
LYMPHOCYTES # BLD AUTO: 2 % (ref 14–44)
MCH RBC QN AUTO: 28.3 PG (ref 26.8–34.3)
MCHC RBC AUTO-ENTMCNC: 32.3 G/DL (ref 31.4–37.4)
MCV RBC AUTO: 88 FL (ref 82–98)
MONOCYTES # BLD AUTO: 0.34 THOUSAND/UL (ref 0–1.22)
MONOCYTES NFR BLD: 5 % (ref 4–12)
NEUTROPHILS # BLD MANUAL: 5.92 THOUSAND/UL (ref 1.85–7.62)
NEUTS SEG NFR BLD AUTO: 87 % (ref 43–75)
PLATELET # BLD AUTO: 230 THOUSANDS/UL (ref 149–390)
PLATELET BLD QL SMEAR: ADEQUATE
PMV BLD AUTO: 11.2 FL (ref 8.9–12.7)
POIKILOCYTOSIS BLD QL SMEAR: PRESENT
POTASSIUM SERPL-SCNC: 3.2 MMOL/L (ref 3.5–5.3)
PROCALCITONIN SERPL-MCNC: 0.36 NG/ML
RBC # BLD AUTO: 3.43 MILLION/UL (ref 3.81–5.12)
RBC MORPH BLD: PRESENT
SODIUM SERPL-SCNC: 136 MMOL/L (ref 136–145)
TARGETS BLD QL SMEAR: PRESENT
TOXIC GRANULES BLD QL SMEAR: PRESENT
VARIANT LYMPHS # BLD AUTO: 5 %
WBC # BLD AUTO: 6.81 THOUSAND/UL (ref 4.31–10.16)

## 2021-08-30 PROCEDURE — 85027 COMPLETE CBC AUTOMATED: CPT | Performed by: STUDENT IN AN ORGANIZED HEALTH CARE EDUCATION/TRAINING PROGRAM

## 2021-08-30 PROCEDURE — 85007 BL SMEAR W/DIFF WBC COUNT: CPT | Performed by: STUDENT IN AN ORGANIZED HEALTH CARE EDUCATION/TRAINING PROGRAM

## 2021-08-30 PROCEDURE — 80048 BASIC METABOLIC PNL TOTAL CA: CPT | Performed by: STUDENT IN AN ORGANIZED HEALTH CARE EDUCATION/TRAINING PROGRAM

## 2021-08-30 PROCEDURE — 99232 SBSQ HOSP IP/OBS MODERATE 35: CPT | Performed by: INTERNAL MEDICINE

## 2021-08-30 PROCEDURE — 84145 PROCALCITONIN (PCT): CPT | Performed by: STUDENT IN AN ORGANIZED HEALTH CARE EDUCATION/TRAINING PROGRAM

## 2021-08-30 RX ORDER — POTASSIUM CHLORIDE 20 MEQ/1
40 TABLET, EXTENDED RELEASE ORAL DAILY
Status: DISCONTINUED | OUTPATIENT
Start: 2021-08-30 | End: 2021-09-01 | Stop reason: HOSPADM

## 2021-08-30 RX ORDER — POTASSIUM CHLORIDE 20 MEQ/1
40 TABLET, EXTENDED RELEASE ORAL ONCE
Status: COMPLETED | OUTPATIENT
Start: 2021-08-30 | End: 2021-08-30

## 2021-08-30 RX ADMIN — FERROUS SULFATE TAB 325 MG (65 MG ELEMENTAL FE) 325 MG: 325 (65 FE) TAB at 06:28

## 2021-08-30 RX ADMIN — BUPROPION HYDROCHLORIDE 300 MG: 150 TABLET, FILM COATED, EXTENDED RELEASE ORAL at 07:44

## 2021-08-30 RX ADMIN — VANCOMYCIN HYDROCHLORIDE 1500 MG: 1 INJECTION, POWDER, LYOPHILIZED, FOR SOLUTION INTRAVENOUS at 01:08

## 2021-08-30 RX ADMIN — POTASSIUM CHLORIDE 40 MEQ: 1500 TABLET, EXTENDED RELEASE ORAL at 12:48

## 2021-08-30 RX ADMIN — VANCOMYCIN HYDROCHLORIDE 1500 MG: 1 INJECTION, POWDER, LYOPHILIZED, FOR SOLUTION INTRAVENOUS at 09:08

## 2021-08-30 RX ADMIN — BUPRENORPHINE AND NALOXONE 8 MG: 8; 2 FILM BUCCAL; SUBLINGUAL at 17:06

## 2021-08-30 RX ADMIN — SODIUM CHLORIDE, SODIUM GLUCONATE, SODIUM ACETATE, POTASSIUM CHLORIDE, MAGNESIUM CHLORIDE, SODIUM PHOSPHATE, DIBASIC, AND POTASSIUM PHOSPHATE 125 ML/HR: .53; .5; .37; .037; .03; .012; .00082 INJECTION, SOLUTION INTRAVENOUS at 03:01

## 2021-08-30 RX ADMIN — THIAMINE HCL TAB 100 MG 100 MG: 100 TAB at 07:44

## 2021-08-30 RX ADMIN — CEFEPIME HYDROCHLORIDE 2000 MG: 2 INJECTION, POWDER, FOR SOLUTION INTRAVENOUS at 07:44

## 2021-08-30 RX ADMIN — BUPRENORPHINE AND NALOXONE 8 MG: 8; 2 FILM BUCCAL; SUBLINGUAL at 07:44

## 2021-08-30 RX ADMIN — FOLIC ACID 1 MG: 1 TABLET ORAL at 07:44

## 2021-08-30 RX ADMIN — ONDANSETRON 4 MG: 2 INJECTION INTRAMUSCULAR; INTRAVENOUS at 07:49

## 2021-08-30 NOTE — PROGRESS NOTES
INTERNAL MEDICINE RESIDENCY PROGRESS NOTE     Name: Giuliano Gomez   Age & Sex: 31 y o  female   MRN: [id number]  Unit/Bed#: -Anto[place]ette   Encounter: [id number]  Team: SOD Team A    PATIENT INFORMATION     Name: Giuliano Gomez   Age & Sex: 31 y o  female   MRN: [id number] Stay Days: 4    ASSESSMENT/PLAN     Principal Problem:    COVID-19  Active Problems:    Vomiting    Opioid dependence ([address] )    Tobacco dependence    Trichimoniasis    Hypokalemia    Anemia    Hepatitis C      Hepatitis C  Assessment & Plan  Patient reports testing positive for Hep C in 2015  She has not been treated for it  Last known viral load [id number] (7/21/20)  She states she was waiting to get clean before seeking treatment  Hepatitis Panel with viral load     Plan:  · Hepatitis-C   · Outpatient GI/hepatologist follow-up     Anemia  Assessment & Plan  Patient vomited red colored emesis on 2 occasions  However she reportedly had drink red color Gatorade and It was unclear whether this was true hematemesis  Current hemoglobin of 9 7  Per chart review her baseline appears to be from 11 5-12  She is not actively bleeding right now  Lab Results   Component Value Date/Time    HGB 9 7 (L) 08/30/2021 06:10 AM    RDW 18 6 (H) 08/30/2021 06:10 AM    HCT 30 0 (L) 08/30/2021 06:10 AM    MCV 88 08/30/2021 06:10 AM    MCHC 32 3 08/30/2021 06:10 AM     Plan:  · Ferrous sulfate 325 mg daily  · Patient is not actively bleeding right now and consider outpatient follow-up for further eval      Hypokalemia  Assessment & Plan  Lab Results   Component Value Date/Time    K 3 2 (L) 08/25/2021 06:24 PM     Hypokalemia in the setting of emesis during admission  2/2 volume depletion  K today is 3 2    Plan:  · Potassium repletion with K-dur 40 mEq x 2 doses  · Will replete with KCl 40mg today, repeat tomorrow AM   · Continue monitoring BMP           Trichimoniasis  Assessment & Plan  UA was significant for moderate WBC and innumeral bacteria, positive for Trichomonas  Patient denied symptoms of vaginitis  She was given 2 g of Flagyl in the ED  Plan:  · Outpatient follow-up after 2 weeks for for repeat testing with NAAT to confirm eradication          Tobacco dependence  Assessment & Plan  Patient smokes half pack per day for the past 5 years  Patient offered nicotine replacement therapy  Patient refused  Patient was counseled on tobacco cessation    Opioid dependence [facility])  Assessment & Plan  Patient has a history of opioid abuse  Currently in rehab at [facility] detox center  No signs of withdrawal     Plan:  · Continue Suboxone 8 mg     Vomiting  Assessment & Plan  2 episodes of vomiting on initial prior to transfer to [facility] for evaluation 2/2 COVID infection vs gabapentin side effect  Patient currently not experiencing any vomiting symptoms  Plan:  · Continue Zofran IV PRN      * COVID-19  Assessment & Plan  Lab Results   Component Value Date/Time    TROPONINI <0 02 08/27/2021 02:07 AM    CKTOTAL 135 08/26/2021 04:57 AM    NTBNP 542 (H) 08/26/2021 04:57 AM    PROCALCITONI 2 60 (H) 08/28/2021 06:23 AM    LACTICACID 0 9 08/25/2021 06:24 PM    WBC 13 44 (H) 08/28/2021 06:15 AM     58-year-old female with symptom onset, including nausea and vomiting, 1 day prior tested positive for COVID  For was significant for shortness of breath, upper respiratory tract infection symptoms, chest pain, abdominal pain, diarrhea, leg swelling  Patient is tachycardic but on room air with SpO2 97%  EKG demonstrates sinus tachycardia  CTA chest findings are indicative of acute COVID infection, and negative for PE  However procalcitonin was elevated 3 06 on admission, increased to 6 13 after  Superficial bacterial infection in the setting of COVID pneumonia suspected      Plan:  · Mild COVID pathway   · Therapeutic enoxaparin for anticoagulation - discontinued  · Patient apparently started on ceftriaxone 1000 mg and doxycycline 100 mg p o  on admission admission - discontinued  · IV vancomycin and cefepime started on   · MRSA positive  · Blood cultures remain negative at 72 hours  · Satting well on room air  · Currently waiting on a procalcitonin, if normal, will DC vancomycin and cefepime        Disposition:  Patient currently medically stable, pending protocol will DC antibiotics  Patient's mother is wanting to take her home, but is unable to come to the hospital until Wednesday morning  Plan for DC Wednesday morning  SUBJECTIVE     Patient seen and examined  No acute events overnight  Patient is feeling well this morning, she had 2 bouts of loose stools overnight but feels well, no complaint  Denies fever or chills, chest pain, heart palpitations, shortness of breath, cough, wheeze, abdominal pain, nausea or vomiting, swelling  OBJECTIVE     Vitals:    21 0958 21 1815 21 2235 21 0756   BP:  126/80 111/68 126/92   BP Location:  Right arm     Pulse: (!) 115  (!) 114    Resp:       Temp:  100 4 °F (38 °C) 99 4 °F (37 4 °C) 98 4 °F (36 9 °C)   TempSrc:  Oral     SpO2: 98%      Weight:       Height:          Temperature:   Temp (24hrs), Av 4 °F (37 4 °C), Min:98 4 °F (36 9 °C), Max:100 4 °F (38 °C)    Temperature: 98 4 °F (36 9 °C)  Intake & Output:  I/O        07 -  0700  07 -  0700  07 -  0700    P  O  458 180     Total Intake(mL/kg) 458 (8 6) 180 (3 4)     Net +458 +180            Unmeasured Urine Occurrence 2 x 5 x     Unmeasured Emesis Occurrence  1 x         Weights:   IBW (Ideal Body Weight): 47 8 kg    Body mass index is 22 3 kg/m²  Weight (last 2 days)     None        Physical Exam  Constitutional:       General: She is not in acute distress  Appearance: She is not ill-appearing  HENT:      Head: Normocephalic and atraumatic  Eyes:      Extraocular Movements: Extraocular movements intact  Pupils: Pupils are equal, round, and reactive to light  Cardiovascular:      Rate and Rhythm: Normal rate and regular rhythm  Heart sounds: No murmur heard  No friction rub  No gallop  Pulmonary:      Effort: No respiratory distress  Breath sounds: Rales (R upper and lower lobes) present  No wheezing or rhonchi  Abdominal:      General: There is no distension  Tenderness: There is no abdominal tenderness  There is no guarding  Musculoskeletal:         General: No swelling  Skin:     General: Skin is warm and dry  Neurological:      General: No focal deficit present  Mental Status: She is alert and oriented to person, place, and time  LABORATORY DATA     Labs: I have personally reviewed pertinent reports    Results from last 7 days   Lab Units 08/30/21  0610 08/29/21  0706 08/28/21  0615 08/27/21  0235   WBC Thousand/uL 6 81 10 18* 13 44* 11 79*   HEMOGLOBIN g/dL 9 7* 10 4* 10 7* 9 7*   HEMATOCRIT % 30 0* 32 4* 34 1* 29 2*   PLATELETS Thousands/uL 230 239 254 251   NEUTROS PCT %  --  84*  --  90*   MONOS PCT %  --  4  --  3*   MONO PCT % 5  --   --   --       Results from last 7 days   Lab Units 08/30/21  0610 08/29/21  0706 08/28/21  0615 08/26/21  0457 08/25/21  1824   POTASSIUM mmol/L 3 2* 3 4* 3 6 3 7 3 2*   CHLORIDE mmol/L 105 108 110* 109* 109*   CO2 mmol/L 26 25 20* 23 21   BUN mg/dL 2* 4* 7 10 13   CREATININE mg/dL 0 24* 0 33* 0 29* 0 60 0 72   CALCIUM mg/dL 7 1* 7 1* 7 3* 7 6* 7 6*   ALK PHOS U/L  --   --   --  300* 344*   ALT U/L  --   --   --  34 37   AST U/L  --   --   --  47* 61*     Results from last 7 days   Lab Units 08/26/21  0457   MAGNESIUM mg/dL 2 0          Results from last 7 days   Lab Units 08/25/21  1824   INR  1 24*   PTT seconds 37     Results from last 7 days   Lab Units 08/25/21  1824   LACTIC ACID mmol/L 0 9     Results from last 7 days   Lab Units 08/27/21  0207 08/26/21  0009   TROPONIN I ng/mL <0 02 <0 02       IMAGING & DIAGNOSTIC TESTING     Radiology Results: I have personally reviewed pertinent reports  CTA ED chest PE Study    Result Date: 8/25/2021  Impression: 1  Multifocal patchy and groundglass airspace opacity, particularly in the right upper and lower lobes In the setting of clinically suspected/proven COVID-19, the above lung parenchymal findings on CT indicate intermediate confidence level for COVID-19  2   No acute pulmonary embolism  3   Partially imaged liver and spleen appear enlarged  If there is clinical concern, this can be further evaluated with ultrasound  The study was marked in [facility] for immediate notification  Workstation performed: [id number]     Other Diagnostic Testing: I have personally reviewed pertinent reports  ACTIVE MEDICATIONS     Current Facility-Administered Medications   Medication Dose Route Frequency    acetaminophen (TYLENOL) tablet 650 mg  650 mg Oral Q6H PRN    buprenorphine-naloxone (Suboxone) film 8 mg  8 mg Sublingual BID    buPROPion (WELLBUTRIN XL) 24 hr tablet 300 mg  300 mg Oral QAM    cefepime (MAXIPIME) 2,000 mg in dextrose 5 % 50 mL IVPB  2,000 mg Intravenous Q8H    ferrous sulfate tablet 325 mg  325 mg Oral Daily With Breakfast    folic acid (FOLVITE) tablet 1 mg  1 mg Oral Daily    multi-electrolyte (PLASMALYTE-A/ISOLYTE-S PH 7 4) IV solution  125 mL/hr Intravenous Continuous    ondansetron (ZOFRAN) injection 4 mg  4 mg Intravenous Q8H PRN    potassium chloride (K-DUR,KLOR-CON) CR tablet 40 mEq  40 mEq Oral Daily    sodium chloride (PF) 0 9 % injection 3 mL  3 mL Intravenous Q1H PRN    thiamine tablet 100 mg  100 mg Oral Daily    vancomycin (VANCOCIN) 1500 mg in sodium chloride 0 9% 250 mL IVPB  1,500 mg Intravenous Q8H       VTE Pharmacologic Prophylaxis: Reason for no pharmacologic prophylaxis No AC, patient is not requiring oxygen, does not need DVT prophylaxis  VTE Mechanical Prophylaxis: sequential compression device    Portions of the record may have been created with voice recognition software    Occasional wrong word or "sound a like" substitutions may have occurred due to the inherent limitations of voice recognition software    Read the chart carefully and recognize, using context, where substitutions have occurred   ==  Yenny Rosado MD  [address]  Internal Medicine Residency PGY-1

## 2021-08-30 NOTE — PROGRESS NOTES
Vancomycin Pharmacy Consult     Ellen Buchanan is an 31 y o  female who is currently receiving IV vancomycin for PNA  Vancomycin Assessment:  1  ID Consult: No  2  Cultures:   8/25 COVID19: pos  8/25 Blood 2/2: NGTD  8/25 Urine: >100K Lactobacillus species  8/27: MRSA: pos  3  Procalcitonin:   8/25: 3 06  8/26: 6 13  8/27: 4 71  8/28: 2 60  8/29: 1 06  4  Renal Function:   SCr: 0 24  CrCl: >100 mL/min  UOP: n/a  5  Days of Therapy: 5  6  Current Dose: 1500 mg IV q8h  7  Last Level: 16 9 (trough 8/29 at 1654)  8  Goal AUC(24h): 400-600  9  Goal Random/Trough: 15-20     Vancomycin Plan:  1  Evaluation: continue current regimen  2  New Dosing: continue 1500 mg IV q8h  Predicted Trough / AUC(24h): 16 4 / 735  3  Next Level: trough 9/2 at [address] will continue to follow closely for s/sx of nephrotoxicity, infusion reactions, and appropriateness of therapy  BMP and CBC will be ordered per protocol  We will continue to follow the patients culture results and clinical progress daily  Jessi Mail  Satish Haskins PharmD  Internal Medicine Clinical Pharmacist Specialist  [phone]  [facility]

## 2021-08-30 NOTE — CASE MANAGEMENT
CM informed pt stable for DC back to [facility]  CM called HOST (P: [phone]) whom had been assisting in coordinating return to [facility]  CM left a detailed message requesting return call to confirm what the Covid protocol is at [facility]

## 2021-08-31 LAB
ANION GAP SERPL CALCULATED.3IONS-SCNC: 8 MMOL/L (ref 4–13)
BUN SERPL-MCNC: <1 MG/DL (ref 5–25)
CALCIUM SERPL-MCNC: 7.8 MG/DL (ref 8.3–10.1)
CHLORIDE SERPL-SCNC: 104 MMOL/L (ref 100–108)
CO2 SERPL-SCNC: 28 MMOL/L (ref 21–32)
CREAT SERPL-MCNC: 0.26 MG/DL (ref 0.6–1.3)
ERYTHROCYTE [DISTWIDTH] IN BLOOD BY AUTOMATED COUNT: 18.8 % (ref 11.6–15.1)
GFR SERPL CREATININE-BSD FRML MDRD: 166 ML/MIN/1.73SQ M
GLUCOSE SERPL-MCNC: 74 MG/DL (ref 65–140)
HCT VFR BLD AUTO: 32.5 % (ref 34.8–46.1)
HGB BLD-MCNC: 10.6 G/DL (ref 11.5–15.4)
MCH RBC QN AUTO: 28.4 PG (ref 26.8–34.3)
MCHC RBC AUTO-ENTMCNC: 32.6 G/DL (ref 31.4–37.4)
MCV RBC AUTO: 87 FL (ref 82–98)
NRBC BLD AUTO-RTO: 0 /100 WBCS
PLATELET # BLD AUTO: 276 THOUSANDS/UL (ref 149–390)
PMV BLD AUTO: 11.5 FL (ref 8.9–12.7)
POTASSIUM SERPL-SCNC: 3.2 MMOL/L (ref 3.5–5.3)
PROCALCITONIN SERPL-MCNC: 0.08 NG/ML
RBC # BLD AUTO: 3.73 MILLION/UL (ref 3.81–5.12)
SODIUM SERPL-SCNC: 140 MMOL/L (ref 136–145)
WBC # BLD AUTO: 7.08 THOUSAND/UL (ref 4.31–10.16)

## 2021-08-31 PROCEDURE — 84145 PROCALCITONIN (PCT): CPT | Performed by: STUDENT IN AN ORGANIZED HEALTH CARE EDUCATION/TRAINING PROGRAM

## 2021-08-31 PROCEDURE — 99232 SBSQ HOSP IP/OBS MODERATE 35: CPT | Performed by: INTERNAL MEDICINE

## 2021-08-31 PROCEDURE — 85027 COMPLETE CBC AUTOMATED: CPT

## 2021-08-31 PROCEDURE — 80048 BASIC METABOLIC PNL TOTAL CA: CPT

## 2021-08-31 RX ADMIN — BUPRENORPHINE AND NALOXONE 8 MG: 8; 2 FILM BUCCAL; SUBLINGUAL at 07:57

## 2021-08-31 RX ADMIN — FOLIC ACID 1 MG: 1 TABLET ORAL at 07:57

## 2021-08-31 RX ADMIN — POTASSIUM CHLORIDE 40 MEQ: 1500 TABLET, EXTENDED RELEASE ORAL at 07:57

## 2021-08-31 RX ADMIN — THIAMINE HCL TAB 100 MG 100 MG: 100 TAB at 07:53

## 2021-08-31 RX ADMIN — BUPROPION HYDROCHLORIDE 300 MG: 150 TABLET, FILM COATED, EXTENDED RELEASE ORAL at 07:53

## 2021-08-31 RX ADMIN — FERROUS SULFATE TAB 325 MG (65 MG ELEMENTAL FE) 325 MG: 325 (65 FE) TAB at 06:04

## 2021-08-31 RX ADMIN — BUPRENORPHINE AND NALOXONE 8 MG: 8; 2 FILM BUCCAL; SUBLINGUAL at 16:59

## 2021-08-31 NOTE — PROGRESS NOTES
INTERNAL MEDICINE RESIDENCY PROGRESS NOTE     Name: Emily Coffey   Age & Sex: 31 y o  female   MRN: [id number]  Unit/Bed#: -01   Encounter: [id number]  Team: SOD Team A    PATIENT INFORMATION     Name: Emily Coffey   Age & Sex: 31 y o  female   MRN: [id number] [facility] Stay Days: 5    ASSESSMENT/PLAN     Principal Problem:    COVID-19  Active Problems:    Vomiting    Opioid dependence ([address] )    Tobacco dependence    Trichimoniasis    Hypokalemia    Anemia    Hepatitis C      Hepatitis C  Assessment & Plan  Patient reports testing positive for Hep C in 2015  She has not been treated for it  Last known viral load [id number] (7/21/20)  She states she was waiting to get clean before seeking treatment  Hepatitis Panel with viral load     Plan:  · Hepatitis-C   · Outpatient GI/hepatologist follow-up     Anemia  Assessment & Plan  Patient vomited red colored emesis on 2 occasions  However she reportedly had drink red color Gatorade and It was unclear whether this was true hematemesis  Current hemoglobin of 10 6, improving  Per chart review her baseline appears to be from 11 5-12  She is not actively bleeding right now  Lab Results   Component Value Date/Time    HGB 10 6 (L) 08/31/2021 06:04 AM    RDW 18 8 (H) 08/31/2021 06:04 AM    HCT 32 5 (L) 08/31/2021 06:04 AM    MCV 87 08/31/2021 06:04 AM    MCHC 32 6 08/31/2021 06:04 AM     Plan:  · Ferrous sulfate 325 mg daily  · Patient is not actively bleeding right now and consider outpatient follow-up for further eval      Hypokalemia  Assessment & Plan  Lab Results   Component      K 08/31/2021 3 2     Hypokalemia in the setting of emesis during admission  2/2 volume depletion  K today is 3 2    Plan:  · Received 40 mEq potassium chloride this morning  · Potassium repletion with K-dur 40 mEq tomorrow AM  · Continue monitoring BMP           Trichimoniasis  Assessment & Plan  UA was significant for moderate WBC and innumeral bacteria, positive for Trichomonas  Patient denied symptoms of vaginitis  She was given 2 g of Flagyl in the ED  Plan:  · Outpatient follow-up after 2 weeks for for repeat testing with NAAT to confirm eradication          Tobacco dependence  Assessment & Plan  Patient smokes half pack per day for the past 5 years  Patient offered nicotine replacement therapy  Patient refused  Patient was counseled on tobacco cessation    Opioid dependence [facility])  Assessment & Plan  Patient has a history of opioid abuse  Currently in rehab at [facility]  No signs of withdrawal     Plan:  · Continue Suboxone 8 mg     Vomiting  Assessment & Plan  2 episodes of vomiting on initial prior to transfer to [facility] for evaluation 2/2 COVID infection vs gabapentin side effect  Patient currently not experiencing any vomiting symptoms  Plan:  · Continue Zofran IV PRN      * COVID-19  Assessment & Plan  Lab Results   Component Value Date/Time    PROCALCITONI 0 08 08/31/2021 06:04 AM    WBC 7 08 08/31/2021 06:04 AM     19-year-old female with symptom onset, including nausea and vomiting, 1 day prior tested positive for COVID  For was significant for shortness of breath, upper respiratory tract infection symptoms, chest pain, abdominal pain, diarrhea, leg swelling  Patient is tachycardic but on room air with SpO2 97%  EKG demonstrates sinus tachycardia  CTA chest findings are indicative of acute COVID infection, and negative for PE  However procalcitonin was elevated 3 06 on admission, increased to 6 13 after  Superficial bacterial infection in the setting of COVID pneumonia suspected      Plan:  · Mild COVID pathway   · Therapeutic enoxaparin for anticoagulation - discontinued  · Patient apparently started on ceftriaxone 1000 mg and doxycycline 100 mg p o  on admission admission - discontinued  · IV vancomycin and cefepime started on 8/26  · MRSA positive - likely chronic carrier  · Blood cultures remain negative   · Satting well on room air  · Procal  0 08, cefepime and vancomycin - discontinued  · Patient okay for DC pending parents schedule  Disposition:  Patient's parents unable to pick her up today, we will plan for discharge tomorrow  SUBJECTIVE     Patient seen and examined  No acute events overnight  Patient potassium at 3 2 yesterday, attempt to replete with potassium chloride 40 mEq yesterday but patient refused at that time  The patient did get potassium chloride 40 mEq this morning after morning labs are drawn  Procal yesterday was 0 08, we discontinued cefepime and vanco       Subjectively today the patient is asymptomatic feeling well, not on O2 supplementation  She did ask for a shower  Denies fever chills this morning, no shortness of breath, no cough or wheeze, no chest pain or heart palpitations or diaphoreses, no abdominal pain nausea vomiting, no dysuria, no swelling  OBJECTIVE     Vitals:    21 0756 21 2030 21 0020 21 0802   BP: 126/92  117/75 116/76   BP Location:       Pulse:   (!) 106 (!) 116   Resp:       Temp: 98 4 °F (36 9 °C)  99 2 °F (37 3 °C) 98 7 °F (37 1 °C)   TempSrc:       SpO2:  95% 96% 96%   Weight:       Height:          Temperature:   Temp (24hrs), Av °F (37 2 °C), Min:98 7 °F (37 1 °C), Max:99 2 °F (37 3 °C)    Temperature: 98 7 °F (37 1 °C)  Intake & Output:  I/O        07 -  0700  07 -  0700  07 -  0700    P  O  180      Total Intake(mL/kg) 180 (3 4)      Net +180             Unmeasured Urine Occurrence 5 x      Unmeasured Emesis Occurrence 1 x          Weights:   IBW (Ideal Body Weight): 47 8 kg    Body mass index is 22 3 kg/m²  Weight (last 2 days)     None        Physical Exam  Vitals and nursing note reviewed  Constitutional:       General: She is not in acute distress  Appearance: She is well-developed  HENT:      Head: Normocephalic and atraumatic     Eyes:      Conjunctiva/sclera: Conjunctivae normal  Cardiovascular:      Rate and Rhythm: Regular rhythm  Tachycardia present  Heart sounds: No murmur heard  Pulmonary:      Effort: Pulmonary effort is normal  No respiratory distress  Breath sounds: Normal breath sounds  No wheezing, rhonchi or rales  Abdominal:      General: Bowel sounds are normal       Palpations: Abdomen is soft  Tenderness: There is no abdominal tenderness  Musculoskeletal:      Cervical back: Neck supple  Skin:     General: Skin is warm and dry  Neurological:      Mental Status: She is alert  LABORATORY DATA     Labs: I have personally reviewed pertinent reports  Results from last 7 days   Lab Units 08/31/21  0604 08/30/21  0610 08/29/21  0706 08/27/21  0235   WBC Thousand/uL 7 08 6 81 10 18* 11 79*   HEMOGLOBIN g/dL 10 6* 9 7* 10 4* 9 7*   HEMATOCRIT % 32 5* 30 0* 32 4* 29 2*   PLATELETS Thousands/uL 276 230 239 251   NEUTROS PCT %  --   --  84* 90*   MONOS PCT %  --   --  4 3*   MONO PCT %  --  5  --   --       Results from last 7 days   Lab Units 08/31/21  0604 08/30/21  0610 08/29/21  0706 08/26/21  0457 08/25/21  1824   POTASSIUM mmol/L 3 2* 3 2* 3 4* 3 7 3 2*   CHLORIDE mmol/L 104 105 108 109* 109*   CO2 mmol/L 28 26 25 23 21   BUN mg/dL <1* 2* 4* 10 13   CREATININE mg/dL 0 26* 0 24* 0 33* 0 60 0 72   CALCIUM mg/dL 7 8* 7 1* 7 1* 7 6* 7 6*   ALK PHOS U/L  --   --   --  300* 344*   ALT U/L  --   --   --  34 37   AST U/L  --   --   --  47* 61*     Results from last 7 days   Lab Units 08/26/21  0457   MAGNESIUM mg/dL 2 0          Results from last 7 days   Lab Units 08/25/21  1824   INR  1 24*   PTT seconds 37     Results from last 7 days   Lab Units 08/25/21  1824   LACTIC ACID mmol/L 0 9     Results from last 7 days   Lab Units 08/27/21  0207 08/26/21  0009   TROPONIN I ng/mL <0 02 <0 02       IMAGING & DIAGNOSTIC TESTING     Radiology Results: I have personally reviewed pertinent reports  CTA ED chest PE Study    Result Date: 8/25/2021  Impression: 1  Multifocal patchy and groundglass airspace opacity, particularly in the right upper and lower lobes In the setting of clinically suspected/proven COVID-19, the above lung parenchymal findings on CT indicate intermediate confidence level for COVID-19  2   No acute pulmonary embolism  3   Partially imaged liver and spleen appear enlarged  If there is clinical concern, this can be further evaluated with ultrasound  The study was marked in [facility] for immediate notification  Workstation performed: [id number]     Other Diagnostic Testing: I have personally reviewed pertinent reports  ACTIVE MEDICATIONS     Current Facility-Administered Medications   Medication Dose Route Frequency    acetaminophen (TYLENOL) tablet 650 mg  650 mg Oral Q6H PRN    buprenorphine-naloxone (Suboxone) film 8 mg  8 mg Sublingual BID    buPROPion (WELLBUTRIN XL) 24 hr tablet 300 mg  300 mg Oral QAM    ferrous sulfate tablet 325 mg  325 mg Oral Daily With Breakfast    folic acid (FOLVITE) tablet 1 mg  1 mg Oral Daily    ondansetron (ZOFRAN) injection 4 mg  4 mg Intravenous Q8H PRN    potassium chloride (K-DUR,KLOR-CON) CR tablet 40 mEq  40 mEq Oral Daily    sodium chloride (PF) 0 9 % injection 3 mL  3 mL Intravenous Q1H PRN    thiamine tablet 100 mg  100 mg Oral Daily       VTE Pharmacologic Prophylaxis: Sequential compression device (Venodyne)  and Reason for no pharmacologic prophylaxis Patient not on O2, does not require pharmacologic prophylaxis  VTE Mechanical Prophylaxis: sequential compression device    Portions of the record may have been created with voice recognition software  Occasional wrong word or "sound a like" substitutions may have occurred due to the inherent limitations of voice recognition software    Read the chart carefully and recognize, using context, where substitutions have occurred   ==  Yenny Rosado MD  [address]  Internal Medicine Residency PGY-1

## 2021-08-31 NOTE — UTILIZATION REVIEW
Continued Stay Review    Date: 8/31                        Current Patient Class: Inpatient Current Level of Care: Med Surg    HPI:25 y o  female initially admitted on 8/26     Assessment/Plan:   K at 3 2 yesterday, attempt to replete with potassium chloride 40 mEq yesterday but patient refused at that time  Given 40 meq potassium chloride this am  Continue monitoring BMP  The patient did get potassium chloride 40 mEq this morning after morning labs are drawn    Procal yesterday was 0 08, we discontinued cefepime and vanco       Vital Signs:   08/31/21 08:02:58  98 7 °F (37 1 °C)  116  Abnormal   --  116/76  89  96 %  --  --   08/31/21 00:20:49  99 2 °F (37 3 °C)  106  Abnormal   --  117/75  89  96 %  --  --   08/30/21 2030  --  --  --  --  --  95 %  None (Room air)  --   08/30/21 07:56:28  98 4 °F (36 9 °C)  --  --  126/92  103  --  --       Pertinent Labs/Diagnostic Results:   Results from last 7 days   Lab Units 08/25/21  1824   SARS-COV-2  Positive*     Results from last 7 days   Lab Units 08/31/21  0604 08/30/21  0610 08/29/21  0706 08/28/21  0615 08/27/21  0235 08/25/21  1824   WBC Thousand/uL 7 08 6 81 10 18* 13 44* 11 79* 10 41*   HEMOGLOBIN g/dL 10 6* 9 7* 10 4* 10 7* 9 7* 9 8*   HEMATOCRIT % 32 5* 30 0* 32 4* 34 1* 29 2* 30 1*   PLATELETS Thousands/uL 276 230 239 254 251 269   NEUTROS ABS Thousands/µL  --   --  8 58*  --  10 72*  --    BANDS PCT %  --   --   --   --   --  55*         Results from last 7 days   Lab Units 08/31/21  0604 08/30/21  0610 08/29/21  0706 08/28/21  0615 08/27/21  0200 08/26/21  0457   SODIUM mmol/L 140 136 136 135* 138 136   POTASSIUM mmol/L 3 2* 3 2* 3 4* 3 6 3 2* 3 7   CHLORIDE mmol/L 104 105 108 110* 108 109*   CO2 mmol/L 28 26 25 20* 25 23   ANION GAP mmol/L 8 5 3* 5 5 4   BUN mg/dL <1* 2* 4* 7 9 10   CREATININE mg/dL 0 26* 0 24* 0 33* 0 29* 0 52* 0 60   EGFR ml/min/1 73sq m 166 171 154 160 132 126   CALCIUM mg/dL 7 8* 7 1* 7 1* 7 3* 7 2* 7 6*   MAGNESIUM mg/dL  --   --   -- --   --  2 0     Results from last 7 days   Lab Units 08/26/21  0457 08/25/21  1824   AST U/L 47* 61*   ALT U/L 34 37   ALK PHOS U/L 300* 344*   TOTAL PROTEIN g/dL 5 9* 5 9*   ALBUMIN g/dL 2 3* 2 3*   TOTAL BILIRUBIN mg/dL 0 33 0 64         Results from last 7 days   Lab Units 08/31/21  0604 08/30/21  0610 08/29/21  0706 08/28/21  0615 08/27/21  0200 08/26/21  0457 08/25/21  1824   GLUCOSE RANDOM mg/dL 74 72 78 69 96 106 92       Results from last 7 days   Lab Units 08/26/21  0457 08/25/21  1824   CK TOTAL U/L 135 178   CK MB INDEX % <1 0 <1 0   CK MB ng/mL <1 0 1 1     Results from last 7 days   Lab Units 08/27/21  0207 08/26/21  0009   TROPONIN I ng/mL <0 02 <0 02     Results from last 7 days   Lab Units 08/25/21  1824   D-DIMER QUANTITATIVE ug/ml FEU 2 79*     Results from last 7 days   Lab Units 08/25/21  1824   PROTIME seconds 15 6*   INR  1 24*   PTT seconds 37     Results from last 7 days   Lab Units 08/25/21  1824   TSH 3RD GENERATON uIU/mL 0 335*     Results from last 7 days   Lab Units 08/31/21  0604 08/30/21  1030 08/29/21  0609 08/28/21  0623 08/27/21  0218   PROCALCITONIN ng/ml 0 08 0 36* 1 06* 2 60* 4 71*     Results from last 7 days   Lab Units 08/25/21  1824   LACTIC ACID mmol/L 0 9             Results from last 7 days   Lab Units 08/26/21  0457 08/25/21  1824   NT-PRO BNP pg/mL 542* 256*     Results from last 7 days   Lab Units 08/26/21  0457   FERRITIN ng/mL 105     Results from last 7 days   Lab Units 08/26/21  0845   HEP C AB  High Reactive*     Results from last 7 days   Lab Units 08/25/21  1824   LIPASE u/L 36*     Results from last 7 days   Lab Units 08/26/21  0457 08/25/21  1824   CRP mg/L 209 0* 128 0*         Results from last 7 days   Lab Units 08/25/21 1919 08/25/21 1917   CLARITY UA  Clear Turbid   COLOR UA  Yellow Yellow   SPEC GRAV UA  1 015 1 013   PH UA  5 5 5 5   GLUCOSE UA mg/dl Negative Negative   KETONES UA mg/dl Negative Negative   BLOOD UA  Small* Small*   PROTEIN UA mg/dl Negative Negative   NITRITE UA  Negative Negative   BILIRUBIN UA  Negative Negative   UROBILINOGEN UA E U /dl 0 2 0 2   LEUKOCYTES UA  Trace* Large*   WBC UA /hpf  --  30-50*   RBC UA /hpf  --  2-4   BACTERIA UA /hpf  --  Innumerable*   EPITHELIAL CELLS WET PREP /hpf  --  Moderate*       Results from last 7 days   Lab Units 08/25/21  1917 08/25/21  1841 08/25/21  1833   BLOOD CULTURE   --  No Growth After 5 Days  No Growth After 5 Days  URINE CULTURE  >100,000 cfu/ml Lactobacillus species*  20,000-29,000 cfu/ml   --   --      Medications:   Scheduled Medications:  buprenorphine-naloxone, 8 mg, Sublingual, BID  buPROPion, 300 mg, Oral, QAM  ferrous sulfate, 325 mg, Oral, Daily With Breakfast  folic acid, 1 mg, Oral, Daily  potassium chloride, 40 mEq, Oral, Daily  thiamine, 100 mg, Oral, Daily      Continuous IV Infusions:    multi-electrolyte (PLASMALYTE-A/ISOLYTE-S PH 7 4) IV solution   Rate: 125 mL/hr Dose: 125 mL/hr  Freq: Continuous Route: IV  Indications of Use: IV Hydration  Last Dose: 125 mL/hr (08/30/21 0301)  Start: 08/26/21 1900 End: 08/31/21 1228    PRN Meds:  acetaminophen, 650 mg, Oral, Q6H PRN  ondansetron, 4 mg, Intravenous, Q8H PRN  sodium chloride (PF), 3 mL, Intravenous, Q1H PRN      Discharge Plan: D    Network Utilization Review Department  ATTENTION: Please call with any questions or concerns to [phone] and carefully listen to the prompts so that you are directed to the right person  All voicemails are confidential   [facility] all requests for admission clinical reviews, approved or denied determinations and any other requests to dedicated fax number below belonging to the campus where the patient is receiving treatment  List of dedicated fax numbers for the Facilities:  [address] DENIALS (Administrative/Medical Necessity) [phone]   1000  [facility] (Maternity/NICU/Pediatrics) [phone]   [address] [place] [address] [phone]     [address] Lindsay [place] [address] [place] [address] Tevin Mcclendon [facility] [phone] [facility] [phone]

## 2021-08-31 NOTE — CASE MANAGEMENT
RYANNE received call from Radha stating that pt is unable to return to [facility] as they are closed to admissions  RYANNE confirmed with pt plan for her mom to p/u for 1-2 days and then she will be going to a Recovery [facility]  RYANNE informed Sejal PEARL provided pt with [facility] contact information to retrieve belongings

## 2021-08-31 NOTE — CONSULTS
Vancomycin IV Pharmacy-to-Dose Consultation    Albert Lisa is a 31 y o  female who was receiving Vancomycin IV with management by the Pharmacy Consult service for treatment of MRSA confirmed concern for hospital aquired infection  The patients Vancomycin therapy has been discontinued  Thank you for allowing us to take part in this patient's care  Pharmacy will sign-off now; please call or re-consult if there are any questions      Abram Bell  Staff Pharmacist

## 2021-09-01 VITALS
TEMPERATURE: 97.3 F | HEART RATE: 118 BPM | WEIGHT: 118 LBS | SYSTOLIC BLOOD PRESSURE: 115 MMHG | RESPIRATION RATE: 17 BRPM | DIASTOLIC BLOOD PRESSURE: 78 MMHG | OXYGEN SATURATION: 95 % | HEIGHT: 61 IN | BODY MASS INDEX: 22.28 KG/M2

## 2021-09-01 PROBLEM — E87.6 HYPOKALEMIA: Status: RESOLVED | Noted: 2021-08-26 | Resolved: 2021-09-01

## 2021-09-01 PROBLEM — R11.10 VOMITING: Status: RESOLVED | Noted: 2021-08-26 | Resolved: 2021-09-01

## 2021-09-01 PROBLEM — A59.9 TRICHIMONIASIS: Status: RESOLVED | Noted: 2021-08-26 | Resolved: 2021-09-01

## 2021-09-01 LAB
ANION GAP SERPL CALCULATED.3IONS-SCNC: 3 MMOL/L (ref 4–13)
ANISOCYTOSIS BLD QL SMEAR: PRESENT
ARTIFACT: PRESENT
BASOPHILS # BLD MANUAL: 0.08 THOUSAND/UL (ref 0–0.1)
BASOPHILS NFR MAR MANUAL: 1 % (ref 0–1)
BUN SERPL-MCNC: 3 MG/DL (ref 5–25)
CALCIUM SERPL-MCNC: 8.1 MG/DL (ref 8.3–10.1)
CHLORIDE SERPL-SCNC: 105 MMOL/L (ref 100–108)
CO2 SERPL-SCNC: 30 MMOL/L (ref 21–32)
CREAT SERPL-MCNC: 0.29 MG/DL (ref 0.6–1.3)
EOSINOPHIL # BLD MANUAL: 0.08 THOUSAND/UL (ref 0–0.4)
EOSINOPHIL NFR BLD MANUAL: 1 % (ref 0–6)
ERYTHROCYTE [DISTWIDTH] IN BLOOD BY AUTOMATED COUNT: 19.3 % (ref 11.6–15.1)
GFR SERPL CREATININE-BSD FRML MDRD: 160 ML/MIN/1.73SQ M
GLUCOSE SERPL-MCNC: 72 MG/DL (ref 65–140)
HCT VFR BLD AUTO: 35.5 % (ref 34.8–46.1)
HGB BLD-MCNC: 11.6 G/DL (ref 11.5–15.4)
LYMPHOCYTES # BLD AUTO: 1.19 THOUSAND/UL (ref 0.6–4.47)
LYMPHOCYTES # BLD AUTO: 14 % (ref 14–44)
MACROCYTES BLD QL AUTO: PRESENT
MCH RBC QN AUTO: 28.6 PG (ref 26.8–34.3)
MCHC RBC AUTO-ENTMCNC: 32.7 G/DL (ref 31.4–37.4)
MCV RBC AUTO: 87 FL (ref 82–98)
METAMYELOCYTES NFR BLD MANUAL: 1 % (ref 0–1)
MONOCYTES # BLD AUTO: 0.59 THOUSAND/UL (ref 0–1.22)
MONOCYTES NFR BLD: 7 % (ref 4–12)
MYELOCYTES NFR BLD MANUAL: 3 % (ref 0–1)
NEUTROPHILS # BLD MANUAL: 6.18 THOUSAND/UL (ref 1.85–7.62)
NEUTS BAND NFR BLD MANUAL: 4 % (ref 0–8)
NEUTS SEG NFR BLD AUTO: 69 % (ref 43–75)
OVALOCYTES BLD QL SMEAR: PRESENT
PLATELET # BLD AUTO: 388 THOUSANDS/UL (ref 149–390)
PLATELET BLD QL SMEAR: ADEQUATE
PMV BLD AUTO: 12.2 FL (ref 8.9–12.7)
POLYCHROMASIA BLD QL SMEAR: PRESENT
POTASSIUM SERPL-SCNC: 3.7 MMOL/L (ref 3.5–5.3)
RBC # BLD AUTO: 4.06 MILLION/UL (ref 3.81–5.12)
RBC MORPH BLD: PRESENT
SODIUM SERPL-SCNC: 138 MMOL/L (ref 136–145)
TARGETS BLD QL SMEAR: PRESENT
WBC # BLD AUTO: 8.47 THOUSAND/UL (ref 4.31–10.16)

## 2021-09-01 PROCEDURE — 85007 BL SMEAR W/DIFF WBC COUNT: CPT

## 2021-09-01 PROCEDURE — 80048 BASIC METABOLIC PNL TOTAL CA: CPT

## 2021-09-01 PROCEDURE — 85027 COMPLETE CBC AUTOMATED: CPT

## 2021-09-01 PROCEDURE — 99238 HOSP IP/OBS DSCHRG MGMT 30/<: CPT | Performed by: INTERNAL MEDICINE

## 2021-09-01 RX ORDER — FERROUS SULFATE 325(65) MG
325 TABLET ORAL EVERY OTHER DAY
Qty: 30 TABLET | Refills: 0 | Status: SHIPPED | OUTPATIENT
Start: 2021-09-01

## 2021-09-01 RX ORDER — BUPRENORPHINE AND NALOXONE 8; 2 MG/1; MG/1
8 FILM, SOLUBLE BUCCAL; SUBLINGUAL 2 TIMES DAILY
Qty: 20 FILM | Refills: 0
Start: 2021-09-01 | End: 2021-09-01 | Stop reason: HOSPADM

## 2021-09-01 RX ORDER — BUPRENORPHINE HYDROCHLORIDE AND NALOXONE HYDROCHLORIDE DIHYDRATE 8; 2 MG/1; MG/1
1 TABLET SUBLINGUAL 2 TIMES DAILY
COMMUNITY

## 2021-09-01 RX ADMIN — BUPROPION HYDROCHLORIDE 300 MG: 150 TABLET, FILM COATED, EXTENDED RELEASE ORAL at 09:14

## 2021-09-01 RX ADMIN — THIAMINE HCL TAB 100 MG 100 MG: 100 TAB at 09:14

## 2021-09-01 RX ADMIN — FERROUS SULFATE TAB 325 MG (65 MG ELEMENTAL FE) 325 MG: 325 (65 FE) TAB at 06:00

## 2021-09-01 RX ADMIN — BUPRENORPHINE AND NALOXONE 8 MG: 8; 2 FILM BUCCAL; SUBLINGUAL at 09:14

## 2021-09-01 RX ADMIN — POTASSIUM CHLORIDE 40 MEQ: 1500 TABLET, EXTENDED RELEASE ORAL at 09:14

## 2021-09-01 RX ADMIN — FOLIC ACID 1 MG: 1 TABLET ORAL at 09:14

## 2021-09-01 NOTE — PLAN OF CARE
Problem: PAIN - ADULT  Goal: Verbalizes/displays adequate comfort level or baseline comfort level  Description: Interventions:  - Encourage patient to monitor pain and request assistance  - Assess pain using appropriate pain scale  - Administer analgesics based on type and severity of pain and evaluate response  - Implement non-pharmacological measures as appropriate and evaluate response  - Consider cultural and social influences on pain and pain management  - Notify physician/advanced practitioner if interventions unsuccessful or patient reports new pain  Outcome: Adequate for Discharge     Problem: INFECTION - ADULT  Goal: Absence or prevention of progression during hospitalization  Description: INTERVENTIONS:  - Assess and monitor for signs and symptoms of infection  - Monitor lab/diagnostic results  - Monitor all insertion sites, i e  indwelling lines, tubes, and drains  - Monitor endotracheal if appropriate and nasal secretions for changes in amount and color  - [place] appropriate cooling/warming therapies per order  - Administer medications as ordered  - Instruct and encourage patient and family to use good hand hygiene technique  - Identify and instruct in appropriate isolation precautions for identified infection/condition  Outcome: Adequate for Discharge     Problem: DISCHARGE PLANNING  Goal: Discharge to home or other facility with appropriate resources  Description: INTERVENTIONS:  - Identify barriers to discharge w/patient and caregiver  - Arrange for needed discharge resources and transportation as appropriate  - Identify discharge learning needs (meds, wound care, etc )  - Arrange for interpretive services to assist at discharge as needed  - Refer to Case Management Department for coordinating discharge planning if the patient needs post-hospital services based on physician/advanced practitioner order or complex needs related to functional status, cognitive ability, or social support system  Outcome: Adequate for Discharge     Problem: Knowledge Deficit  Goal: Patient/family/caregiver demonstrates understanding of disease process, treatment plan, medications, and discharge instructions  Description: Complete learning assessment and assess knowledge base  Interventions:  - Provide teaching at level of understanding  - Provide teaching via preferred learning methods  Outcome: Adequate for Discharge     Problem: Nutrition/Hydration-ADULT  Goal: Nutrient/Hydration intake appropriate for improving, restoring or maintaining nutritional needs  Description: Monitor and assess patient's nutrition/hydration status for malnutrition  Collaborate with interdisciplinary team and initiate plan and interventions as ordered  Monitor patient's weight and dietary intake as ordered or per policy  Utilize nutrition screening tool and intervene as necessary  Determine patient's food preferences and provide high-protein, high-caloric foods as appropriate       INTERVENTIONS:  - Monitor oral intake, urinary output, labs, and treatment plans  - Assess nutrition and hydration status and recommend course of action  - Evaluate amount of meals eaten  - Assist patient with eating if necessary   - Allow adequate time for meals  - Recommend/ encourage appropriate diets, oral nutritional supplements, and vitamin/mineral supplements  - Order, calculate, and assess calorie counts as needed  - Recommend, monitor, and adjust tube feedings and TPN/PPN based on assessed needs  - Assess need for intravenous fluids  - Provide specific nutrition/hydration education as appropriate  - Include patient/family/caregiver in decisions related to nutrition  Outcome: Adequate for Discharge

## 2021-09-01 NOTE — DISCHARGE SUMMARY
[facility] Discharge Summary - Medical Radha Benjamin 31 y o  female MRN: [id number]    [place] [address] Room / Bed: [facility] [phone] Encounter: [id number]    BRIEF OVERVIEW    Admitting Provider: Wolf Barraza MD  Discharge Provider: Vicenta Syed MD    Discharge To: Home    Admission Date: 8/25/2021     Discharge Date: 9/01/21    Primary Discharge Diagnosis  Principal Problem:    COVID-19  Active Problems:    Opioid dependence (HCC)    Tobacco dependence    Anemia    Hepatitis C  Resolved Problems:    Vomiting    Trichimoniasis    Hypokalemia      Other Problems Addressed  Hypokalemia  Anemia  Trichomoniasis    Consulting Providers   None    Therapeutic Operative Procedures Performed  None    Diagnostic Procedures Performed  CTA chest PE study    Discharge Disposition: Final discharge disposition not confirmed  Discharged With Lines: no    Test Results Pending at Discharge: none    Outpatient Follow-Up  F/u with establishing primary care physician  Follow up with consulting providers  none  Active Issues Requiring Follow-up   Opioid dependence  Hepatitis C  Trichimoniasis    Code Status: Level 1 - Full Code    Medications   See after visit summary for reconciled discharge medications provided to patient and family  Allergies  No Known Allergies  Discharge Diet: regular diet  Activity restrictions: none    [address] Course  Patient was brought to [facility] on 8/25/21 via EMS from [facility] where she was receiving rehab  She came in with a primary complaint of nausea and vomiting and questionable hematemesis  She reported tested positive for COVID several days ago, but test performed that was negative  In the ED she was febrile at 100 5, tachycardic at 136, tachypneic at 22,  satting at 96% on room air  Patient in the ED was COVID test positive EKG shows sinus tachycardia   Labs were notable for an elevated NT proBNP of 542, elevated CRP of 209, leukocytosis at 12 6, anemia hemoglobin of 9 9 and hematocrit 30 4 with an elevated red cell distribution width of 17 9  Albumin was low at 2 3, corrected calcium is 10 0, AST was 47, alk-phos 300, total protein 5 9  Hypokalemia at 3 2, procalcitonin of 3 06   D-dimer was 2 79, CT PE study was negative but did demonstrate multifocal patchy and ground-glass opacities  Procalcitonin did eventually increased to 6 13, with superficial bacterial infection in the setting of COVID pneumonia suspected  Patient was admitted, placed on the mild COVID pathwayPatient was started on ceftriaxone 1000 mg and doxycycline 100 mg p o  And then soon after transition to IV vancomycin and cefepime  UA was significant for moderate wbc's and  bacteria, positive for trichomoniasis  Patient to receive 2 g of Flagyl in the ED  Patient with a self-reported history of hepatitis-C, hep C quantitative was 390  Patient received 40 mEq x2 of K-Dur for hypokalemia, Zofran for vomiting, for Suboxone 8 mg was continued, iron studies suggestive of iron deficiency anemia and patient was started on oral iron sulfate  Blood cultures were negative  Patient did come back positive for MRSA colonization in her nares  Procalcitonin was trended, potentially returning to 0 08, at which time cefepime and vancomycin were discontinued  With course of her 8 day stay the patient responded well to oral iron supplementation for her anemia, potassium supplementation for hypokalemia  She did not require oxygen at any point  Patient was eventually discharged to her home on 09/01/2021, with recommendations to follow-up with GI for her hepatitis-C, PCP to follow-up for trichomoniasis, and in rehab facility for continuation of her Suboxone  Vitals day of discharge:  97 3 F, heart rate 118, respiratory rate 17, blood pressure 115/78, 95% O2 saturation on room air      Physical exam date of discharge:  General appearance:  Patient was lying in bed not in any acute distress alert and oriented x3  HEENT:  Pupils are equally round reactive to light, extraocular motion intact, no scleral icterus or conjunctival pallor, no cervical lymphadenopathy, moist oral mucosa  Cardiac:  Tachycardic, regular rhythm, distant S1-S2 no murmurs rubs gallops  Pulmonary:  Lungs clear to auscultation bilaterally, no wheezes rhonchi rales  Abdomen: Bowel sounds present  No tenderness to palpation, soft, without distention  Extremities:  Strength and sensation grossly intact, no edema or rash  Addressed Illnessess  Principal Problem:    COVID-19  Active Problems:    Opioid dependence (HCC)    Tobacco dependence    Anemia    Hepatitis C  Resolved Problems:    Vomiting    Trichimoniasis    Hypokalemia        Other Pertinent Test Results  HCV PCR quantitative:  390  Urine microscopic:  Positive for trichomoniasis    Discharge Condition: stable      Discharge  Statement   I spent 30 minutes minutes discharging the patient  This time was spent on the day of discharge  I had direct contact with the patient on the day of discharge  Additional documentation is required if more than 30 minutes were spent on discharge

## 2021-09-01 NOTE — DISCHARGE INSTRUCTIONS
How to Stop Smoking   WHAT YOU NEED TO KNOW:   You will improve your health and the health of others around you if you stop smoking  Your risk for heart and lung disease, cancer, stroke, heart attack, and vision problems will also decrease  Your adolescent can help prevent or stop harm to his or her brain or body  This will help him or her become a healthy adult  You can benefit from quitting no matter how long you have smoked  DISCHARGE INSTRUCTIONS:   Prepare to stop smoking:  Nicotine is a highly addictive drug found in cigarettes  Withdrawal symptoms can happen when you stop smoking and make it hard to quit  These include anxiety, depression, irritability, trouble sleeping, and increased appetite  You increase your chances of success if you prepare to quit  · Set a quit date  Anmol Prow a date that is within the next 2 weeks  Do not pick a day that you think may be stressful or busy  Write down the day or [place] it on your calender  · Tell friends and family that you plan to quit  Explain that you may have withdrawal symptoms when you try to quit  Ask them to support you  They may be able to encourage you and help reduce your stress to make it easier for you to quit  · Make a list of your reasons for quitting  Put the list somewhere you will see it every day, such as your refrigerator  You can look at the list when you have a craving  · Remove all tobacco and nicotine products from your home, car, and workplace  Also, remove anything else that will tempt you to smoke, such as lighters, matches, or ashtrays  Clean your car, home, and places at work that smell like smoke  The smell of smoke can trigger a craving  · Identify triggers that make you want to smoke  This may include activities, feelings, or people  Also write down 1 way you can deal with each of your triggers  For example, if you want to smoke as soon as you wake up, plan another activity during this time, such as exercise      · Make a plan for how you will quit  Learn about the tools that can help you quit, such as medicine, counseling, or nicotine replacement therapy  Choose at least 2 options to help you quit  · Help your adolescent make a plan to quit  The plan will be more successful if your adolescent makes his or her own decisions  Do not try to pressure him or her to quit immediately or in a certain way  Be supportive and offer help if needed  Tools to help you stop smoking:   · Counseling  from a trained healthcare provider can provide you with support and skills to quit smoking  The provider will also teach you to manage your withdrawal symptoms and cravings  You may receive counseling from one counselor, in group therapy, or through phone therapy called a quit line  · Nicotine replacement therapy (NRT)  such as nicotine patches, gum, or lozenges may help reduce your nicotine cravings  You may get these without a doctor's order  Do not use e-cigarettes or smokeless tobacco in place of cigarettes or to help you quit  They still contain nicotine  · Prescription medicines  such as nasal sprays or nicotine inhalers may help reduce your withdrawal symptoms  Other medicines may also be used to reduce your urge to smoke  Ask your healthcare provider about these medicines  You may need to start certain medicines 2 weeks before your quit date for them to work well  · Hypnosis  is a practice that helps guide you through thoughts and feelings  Hypnosis may help decrease your cravings and make you more willing to quit  · Acupuncture therapy  uses very thin needles to balance energy channels in the body  This is thought to help decrease cravings and symptoms of nicotine withdrawal     · Support groups  let you talk to others who are trying to quit or have already quit  It may be helpful to speak with others about how they quit  Manage your cravings:   · Avoid situations, people, and places that tempt you to smoke    Go to nonsmoking places, such as libraries or restaurants  Understand what tempts you and try to avoid these things  · Keep your hands busy  Hold things such as a stress ball or pen  · Put candy or toothpicks in your mouth  Keep lollipops, sugarless gum, or toothpicks with you at all times  · Do not have alcohol or caffeine  These drinks may tempt you to smoke  Drink healthy liquids such as water or juice instead  · Reward yourself when you resist your cravings  Rewards will motivate you and help you stay positive  · Do an activity that distracts you from your craving  Examples include cleaning, creating art, or gardening  Prevent weight gain after you quit:  You may gain a few pounds after you quit smoking  It is healthier for you to gain a few pounds than to continue to smoke  The following can help you prevent weight gain:  · Eat a variety of healthy foods  Healthy foods include fruits, vegetables, whole-grain breads, low-fat dairy products, beans, lean meats, and fish  Eat healthy snacks, such as low-fat yogurt, if you get hungry between meals  · Drink water before, during, and between meals  This will make your stomach feel full and help prevent you from overeating  Ask your healthcare provider how much liquid to drink each day and which liquids are best for you  · Be physically active  Activity may help reduce your cravings and reduce stress  Take a walk or do some kind of physical activity every day  Ask your healthcare provider which activities are right for you  For support and more information:   · American [employer] Association  [address] [facility],[address]  [place] [address] [facility]  Phone: [facility] [address]  Phone: 1- 443 - 173-8974  Web Address: Kashmir Nasseo  liane    · Smokefree  gov  Phone: 9- 359 - 391-2419  Web Address: www smokefree  [address] 2021 Information is for End User's use only and may not be sold, redistributed or otherwise used for commercial purposes  All illustrations and images included in CareNotes® are the copyrighted property of A D A M , Inc  or Saad Sampson  The above information is an  only  It is not intended as medical advice for individual conditions or treatments  Talk to your doctor, nurse or pharmacist before following any medical regimen to see if it is safe and effective for you

## 2021-09-01 NOTE — NURSING NOTE
When I went to round on patient and assess her, she was unhooked from her sharan continuous monitor  Patient is on room air and has not had any desaturation, lungs were clear, spot check patient was  and SpO2 95%  She refuses to wear the continuous sensor for massismo

## 2021-09-02 NOTE — UTILIZATION REVIEW
Notification of Discharge   This is a Notification of Discharge from our facility [address]  Please be advised that this patient has been discharge from our facility  Below you will find the admission and discharge date and time including the patients disposition  UTILIZATION REVIEW CONTACT:  Munir Nolasco  Utilization   Network Utilization Review Department  Phone: [phone] x carefully listen to the prompts  All voicemails are confidential   Email: [email or web address]     PHYSICIAN ADVISORY SERVICES:  FOR [id number] REVIEW - MEDICAL NECESSITY DENIAL  Phone: [phone]  Fax: [phone]  Email: [email or web address]     PRESENTATION DATE: 8/25/2021  5:20 PM  OBERVATION ADMISSION DATE:   INPATIENT ADMISSION DATE: 8/26/21  2:58 PM   DISCHARGE DATE: 9/1/2021  3:15 PM  DISPOSITION: Home/Self Care Home/Self Care      IMPORTANT INFORMATION:  Send all requests for admission clinical reviews, approved or denied determinations and any other requests to dedicated fax number below belonging to the campus where the patient is receiving treatment   List of dedicated fax numbers:  [address] DENIALS (Administrative/Medical Necessity) [phone]   [address] (Maternity/NICU/Pediatrics) [phone]   Rinda Purple [phone]   Otto Gouge [phone]   [facility] [phone]   [address] [phone]   [facility]  [phone]   [address] [facility] [address]  [address] [facility] 1000 [address] [facility] [phone]